# Patient Record
Sex: FEMALE | Race: BLACK OR AFRICAN AMERICAN | Employment: UNEMPLOYED | URBAN - METROPOLITAN AREA
[De-identification: names, ages, dates, MRNs, and addresses within clinical notes are randomized per-mention and may not be internally consistent; named-entity substitution may affect disease eponyms.]

---

## 2019-06-21 ENCOUNTER — HOSPITAL ENCOUNTER (EMERGENCY)
Age: 24
Discharge: HOME | End: 2019-06-21
Attending: EMERGENCY MEDICINE
Payer: MEDICARE

## 2019-06-21 VITALS
HEIGHT: 61 IN | TEMPERATURE: 99.1 F | BODY MASS INDEX: 37.76 KG/M2 | SYSTOLIC BLOOD PRESSURE: 105 MMHG | OXYGEN SATURATION: 99 % | RESPIRATION RATE: 20 BRPM | HEART RATE: 99 BPM | WEIGHT: 200 LBS | DIASTOLIC BLOOD PRESSURE: 81 MMHG

## 2019-06-21 DIAGNOSIS — J45.40 MODERATE PERSISTENT ASTHMA WITHOUT COMPLICATION: Primary | ICD-10-CM

## 2019-06-21 PROCEDURE — 94664 DEMO&/EVAL PT USE INHALER: CPT

## 2019-06-21 PROCEDURE — 99284 EMERGENCY DEPT VISIT MOD MDM: CPT

## 2019-06-21 PROCEDURE — 6370000000 HC RX 637 (ALT 250 FOR IP): Performed by: NURSE PRACTITIONER

## 2019-06-21 PROCEDURE — 94640 AIRWAY INHALATION TREATMENT: CPT

## 2019-06-21 PROCEDURE — 6360000002 HC RX W HCPCS: Performed by: NURSE PRACTITIONER

## 2019-06-21 RX ORDER — ALBUTEROL SULFATE 90 UG/1
2 AEROSOL, METERED RESPIRATORY (INHALATION)
Status: DISCONTINUED | OUTPATIENT
Start: 2019-06-21 | End: 2019-06-22 | Stop reason: HOSPADM

## 2019-06-21 RX ORDER — IPRATROPIUM BROMIDE AND ALBUTEROL SULFATE 2.5; .5 MG/3ML; MG/3ML
1 SOLUTION RESPIRATORY (INHALATION)
Status: DISCONTINUED | OUTPATIENT
Start: 2019-06-22 | End: 2019-06-22 | Stop reason: HOSPADM

## 2019-06-21 RX ORDER — PREDNISONE 10 MG/1
TABLET ORAL
Qty: 20 TABLET | Refills: 0 | Status: SHIPPED | OUTPATIENT
Start: 2019-06-21 | End: 2019-07-01

## 2019-06-21 RX ORDER — ALBUTEROL SULFATE 90 UG/1
2 AEROSOL, METERED RESPIRATORY (INHALATION) EVERY 6 HOURS PRN
Status: DISCONTINUED | OUTPATIENT
Start: 2019-06-21 | End: 2019-06-22 | Stop reason: HOSPADM

## 2019-06-21 RX ORDER — PREDNISONE 20 MG/1
60 TABLET ORAL ONCE
Status: COMPLETED | OUTPATIENT
Start: 2019-06-21 | End: 2019-06-21

## 2019-06-21 RX ORDER — ALBUTEROL SULFATE 2.5 MG/3ML
5 SOLUTION RESPIRATORY (INHALATION)
Status: DISCONTINUED | OUTPATIENT
Start: 2019-06-21 | End: 2019-06-22 | Stop reason: HOSPADM

## 2019-06-21 RX ADMIN — PREDNISONE 60 MG: 20 TABLET ORAL at 22:43

## 2019-06-21 RX ADMIN — ALBUTEROL SULFATE 5 MG: 2.5 SOLUTION RESPIRATORY (INHALATION) at 22:48

## 2019-06-22 NOTE — PROGRESS NOTES
06/21/19 0313   RT Misc Charges   $RT Education $HHN/MDI/IPPB Demo or Eval     Inhaler / Aerosol Education        [x] Served spacer    [x] Good return demonstration per patient   [x] Aerosolized Medications:     Verbal education has been provided in the use, benefits and possible adverse reactions of aerosolized medications used in the treatment of this patient.     [x] Other: pt given MDI and spacer to take home per Miguel Angel Rodrigues, DEON - CNP

## 2019-06-22 NOTE — ED PROVIDER NOTES
99.1 °F (37.3 °C). Her blood pressure is 105/81 and her pulse is 99. Her respiration is 20 and oxygen saturation is 99%.    21 y.o. female heart regular rate and rhythm, lungs clear to auscultation, abdomen soft and nontender, capillary refill less than 2 seconds, radial pulses 2+/4 and equal bilaterally    Impression: Vital signs are stable. Heart regular rate and rhythm. Lungs inspiratory and expiratory wheezing heard at the bases. Patient was treated with a breathing treatment and 60 mg prednisone with significant improvement in her symptoms. She was given a albuterol inhaler with a spacer to take home. I suspect she was having a asthma exacerbation. I have low suspicion for pneumonia, sepsis, CAD, PE or anaphylaxis. Plan: The patient will be discharged home with a prescription for a prednisone burst and she was told to take her albuterol inhaler as needed. Instructed to follow-up with her PCP in 1 to 2 days and to return to the ED for worsening symptoms or any other concern.          Amy Johnson DO  Attending Emergency Physician         Amy Johnson DO  06/22/19 1644

## 2019-06-22 NOTE — PROGRESS NOTES
Emergency Department Bronchodilator Assessment    Patient Assessment complete. No admission diagnoses are documented for this encounter. .   Vitals:    06/21/19 2234   BP: 105/81   Pulse: 99   Resp: 20   Temp: 99.1 °F (37.3 °C)   SpO2: 99%   . Patients home meds are   Prior to Admission medications    Medication Sig Start Date End Date Taking? Authorizing Provider   meloxicam (MOBIC) 15 MG tablet Take 15 mg by mouth daily    Historical Provider, MD Philip MENG treatment(s) given x 1.       Breath Sounds:   LUNG FIELD Pre-Treatment Post-Treatment   RUL  insp/exp whz      RML insp/exp whz      RLL   insp/exp whz      RADHA   insp/exp whz      LLL   insp/exp whz        COMMENTS:    · Bronchodilator assessment at level  3  []    Bronchodilator Assessment    BRONCHODILATOR ASSESSMENT SCORE  Score 1 2 3 4   Breath Sounds   []  Clear []  Mild Wheezing with good aeration [x]  Moderate I/E wheezing with adequate aeration []  Poor Aeration or diffuse wheezing   Respiratory Rate [x]  Less than 20 []  20-25 []  Greater than 25  []  Greater than 35    Dyspnea []  No SOB  [x]  SOB with minimal activity []  Speaking in partial sentences []  Acute/ At rest   Peakflow (asthma) []  80 % or greater predicted/PB  []  Unable []  70% or greater predicted/PB  []  Unable []  51%-70% predicted/PB  []  Unable []  Less than 50% predicted/PB  []  Unable due to distress   FEV1 % Predicted []  Greater than 69%  []  Unable  []  Less than 50%-69%  []  Unable  []  Less than 35%-49%  []  Unable  []  Less than 35%  []  Unable due to distress     Predicted Peak Expiratory Flow Rate                                       Height (in)  Female       Height (in) Male           Age 64 61 64 63 64 65 72 79 Age            51 890 272 295 821 615 113 797 215  17 93 85 72 25 36 09 79 35   09 738 791 927 832 323 818 947 886 92 664 390 822 419 451 392 783 432 352   05 718 563 402 834 223 460 089 864 88 608 729 949 [phone]   09 209 293 408 299 444 [id number] [id number]   70 269 284 299 314 329 344 359 374 70 353 382 410 700 476 445 133 188 598   89 379 347 534 271 220 896 981 936 50 495 598 671 880 548 [phone] 545   14 284 165 607 384 173 778 717 827 68 182 324 655 657 290 [phone]

## 2019-07-01 ENCOUNTER — HOSPITAL ENCOUNTER (EMERGENCY)
Age: 24
Discharge: HOME | End: 2019-07-01
Attending: EMERGENCY MEDICINE
Payer: MEDICARE

## 2019-07-01 ENCOUNTER — APPOINTMENT (OUTPATIENT)
Dept: ULTRASOUND IMAGING | Age: 24
End: 2019-07-01
Payer: MEDICARE

## 2019-07-01 VITALS
SYSTOLIC BLOOD PRESSURE: 122 MMHG | DIASTOLIC BLOOD PRESSURE: 86 MMHG | RESPIRATION RATE: 14 BRPM | BODY MASS INDEX: 39.68 KG/M2 | WEIGHT: 210 LBS | TEMPERATURE: 99 F | HEART RATE: 94 BPM | OXYGEN SATURATION: 98 %

## 2019-07-01 DIAGNOSIS — O20.0 THREATENED MISCARRIAGE IN EARLY PREGNANCY: Primary | ICD-10-CM

## 2019-07-01 LAB
-: ABNORMAL
AMORPHOUS: ABNORMAL
BACTERIA: ABNORMAL
BILIRUBIN URINE: NEGATIVE
CASTS UA: ABNORMAL /LPF (ref 0–8)
COLOR: YELLOW
COMMENT UA: ABNORMAL
CRYSTALS, UA: ABNORMAL /HPF
DIRECT EXAM: NORMAL
EPITHELIAL CELLS UA: ABNORMAL /HPF (ref 0–5)
GLUCOSE URINE: NEGATIVE
HCG QUANTITATIVE: 2589 IU/L
KETONES, URINE: NEGATIVE
LEUKOCYTE ESTERASE, URINE: ABNORMAL
Lab: NORMAL
MUCUS: ABNORMAL
NITRITE, URINE: NEGATIVE
OTHER OBSERVATIONS UA: ABNORMAL
PH UA: 5 (ref 5–8)
PROTEIN UA: NEGATIVE
RBC UA: ABNORMAL /HPF (ref 0–4)
RENAL EPITHELIAL, UA: ABNORMAL /HPF
SPECIFIC GRAVITY UA: 1.02 (ref 1–1.03)
SPECIMEN DESCRIPTION: NORMAL
TRICHOMONAS: ABNORMAL
TURBIDITY: CLEAR
URINE HGB: NEGATIVE
UROBILINOGEN, URINE: NORMAL
WBC UA: ABNORMAL /HPF (ref 0–5)
YEAST: ABNORMAL

## 2019-07-01 PROCEDURE — 87510 GARDNER VAG DNA DIR PROBE: CPT

## 2019-07-01 PROCEDURE — 87660 TRICHOMONAS VAGIN DIR PROBE: CPT

## 2019-07-01 PROCEDURE — 84702 CHORIONIC GONADOTROPIN TEST: CPT

## 2019-07-01 PROCEDURE — 6370000000 HC RX 637 (ALT 250 FOR IP): Performed by: STUDENT IN AN ORGANIZED HEALTH CARE EDUCATION/TRAINING PROGRAM

## 2019-07-01 PROCEDURE — 81001 URINALYSIS AUTO W/SCOPE: CPT

## 2019-07-01 PROCEDURE — 87591 N.GONORRHOEAE DNA AMP PROB: CPT

## 2019-07-01 PROCEDURE — 76801 OB US < 14 WKS SINGLE FETUS: CPT

## 2019-07-01 PROCEDURE — 99284 EMERGENCY DEPT VISIT MOD MDM: CPT

## 2019-07-01 PROCEDURE — 87480 CANDIDA DNA DIR PROBE: CPT

## 2019-07-01 PROCEDURE — 87086 URINE CULTURE/COLONY COUNT: CPT

## 2019-07-01 PROCEDURE — 87491 CHLMYD TRACH DNA AMP PROBE: CPT

## 2019-07-01 RX ORDER — SWAB
1 SWAB, NON-MEDICATED MISCELLANEOUS DAILY
Qty: 30 TABLET | Refills: 0 | Status: SHIPPED | OUTPATIENT
Start: 2019-07-01 | End: 2021-08-14

## 2019-07-01 RX ORDER — CEPHALEXIN 500 MG/1
500 CAPSULE ORAL 3 TIMES DAILY
Qty: 21 CAPSULE | Refills: 0 | Status: SHIPPED | OUTPATIENT
Start: 2019-07-01 | End: 2019-07-11

## 2019-07-01 RX ORDER — CEPHALEXIN 500 MG/1
500 CAPSULE ORAL ONCE
Status: COMPLETED | OUTPATIENT
Start: 2019-07-01 | End: 2019-07-01

## 2019-07-01 RX ADMIN — CEPHALEXIN 500 MG: 500 CAPSULE ORAL at 11:39

## 2019-07-01 ASSESSMENT — PAIN DESCRIPTION - PAIN TYPE: TYPE: ACUTE PAIN

## 2019-07-01 ASSESSMENT — ENCOUNTER SYMPTOMS
COUGH: 0
ABDOMINAL PAIN: 0
SHORTNESS OF BREATH: 0
RHINORRHEA: 0
BACK PAIN: 0
NAUSEA: 0
VOMITING: 0

## 2019-07-01 ASSESSMENT — PAIN DESCRIPTION - LOCATION: LOCATION: ABDOMEN

## 2019-07-01 ASSESSMENT — PAIN DESCRIPTION - DESCRIPTORS: DESCRIPTORS: CRAMPING

## 2019-07-01 ASSESSMENT — PAIN DESCRIPTION - PROGRESSION: CLINICAL_PROGRESSION: NOT CHANGED

## 2019-07-01 ASSESSMENT — PAIN DESCRIPTION - ORIENTATION: ORIENTATION: LOWER

## 2019-07-01 ASSESSMENT — PAIN DESCRIPTION - FREQUENCY: FREQUENCY: INTERMITTENT

## 2019-07-01 ASSESSMENT — PAIN SCALES - GENERAL: PAINLEVEL_OUTOF10: 8

## 2019-07-01 NOTE — ED PROVIDER NOTES
Day Smoker     Types: Cigarettes   Substance and Sexual Activity    Alcohol use: No    Drug use: No    Sexual activity: Not on file   Lifestyle    Physical activity:     Days per week: Not on file     Minutes per session: Not on file    Stress: Not on file   Relationships    Social connections:     Talks on phone: Not on file     Gets together: Not on file     Attends Hindu service: Not on file     Active member of club or organization: Not on file     Attends meetings of clubs or organizations: Not on file     Relationship status: Not on file    Intimate partner violence:     Fear of current or ex partner: Not on file     Emotionally abused: Not on file     Physically abused: Not on file     Forced sexual activity: Not on file   Other Topics Concern    Not on file   Social History Narrative    Not on file        History reviewed. No pertinent family history. Allergies:  Patient has no known allergies. Home Medications:     Prior to Admission medications    Medication Sig Start Date End Date Taking? Authorizing Provider   cephALEXin (KEFLEX) 500 MG capsule Take 1 capsule by mouth 3 times daily 7/1/19  Yes Giovani Mendez MD   Prenatal Vit-Fe Fumarate-FA (PRENATAL VITAMIN) 28-0.8 MG TABS tablet Take 1 tablet by mouth daily 7/1/19  Yes Giovani Mendez MD   predniSONE (DELTASONE) 10 MG tablet Take 4 tablets by mouth once daily for 5 days 6/21/19 7/1/19 Yes Dawn Juarez, APRN - CNP       REVIEW OF SYSTEMS    (2-9 systems for level 4, 10 or more for level 5)       Review of Systems   Constitutional: Negative for chills and fever. HENT: Negative for congestion and rhinorrhea. Respiratory: Negative for cough and shortness of breath. Cardiovascular: Negative for chest pain. Gastrointestinal: Negative for abdominal pain, nausea and vomiting. Genitourinary: Positive for vaginal bleeding, vaginal discharge and vaginal pain. Negative for decreased urine volume and urgency.    Musculoskeletal: Negative for back pain and gait problem. Skin: Negative for rash. Neurological: Negative for light-headedness and headaches. PHYSICAL EXAM   (up to 7 for level 4, 8 or more for level 5)      INITIAL VITALS:    /86   Pulse 94   Temp 99 °F (37.2 °C) (Oral)   Resp 14   Wt 210 lb (95.3 kg)   LMP 2019   SpO2 98%   BMI 39.68 kg/m²      Physical Exam   Constitutional: She is oriented to person, place, and time. She appears well-developed and well-nourished. HENT:   Head: Normocephalic and atraumatic. Mouth/Throat: Oropharynx is clear and moist.   Eyes: Conjunctivae and EOM are normal.   Cardiovascular: Normal rate and regular rhythm. Pulmonary/Chest: Effort normal and breath sounds normal.   Abdominal: Soft. Bowel sounds are normal.   Midline suprapubic ttp, no rebound or guarding, no RLQ tenderness, No CVA tenderness   Musculoskeletal: Normal range of motion. She exhibits no edema. Neurological: She is alert and oriented to person, place, and time. Skin: Skin is warm. Capillary refill takes less than 2 seconds. Nursing note and vitals reviewed.       DIFFERENTIAL  DIAGNOSIS     PLAN (LABS / IMAGING / EKG):     Orders Placed This Encounter   Procedures    VAGINITIS DNA PROBE    C.trachomatis N.gonorrhoeae DNA    Urine Culture    US OB LESS THAN 14 WEEKS SINGLE OR FIRST GESTATION    HCG, Quantitative, Pregnancy    Urinalysis    Microscopic Urinalysis    Vaginal exam    Insert peripheral IV       MEDICATIONS ORDERED:     Orders Placed This Encounter   Medications    cephALEXin (KEFLEX) capsule 500 mg    cephALEXin (KEFLEX) 500 MG capsule     Sig: Take 1 capsule by mouth 3 times daily     Dispense:  21 capsule     Refill:  0    Prenatal Vit-Fe Fumarate-FA (PRENATAL VITAMIN) 28-0.8 MG TABS tablet     Sig: Take 1 tablet by mouth daily     Dispense:  30 tablet     Refill:  0       DDX: ectopic, threatned , UTI, STI    DIAGNOSTIC RESULTS / EMERGENCY DEPARTMENT COURSE /

## 2019-07-02 LAB
C TRACH DNA GENITAL QL NAA+PROBE: ABNORMAL
CULTURE: NORMAL
Lab: NORMAL
N. GONORRHOEAE DNA: NEGATIVE
SPECIMEN DESCRIPTION: ABNORMAL
SPECIMEN DESCRIPTION: NORMAL

## 2019-07-03 ENCOUNTER — TELEPHONE (OUTPATIENT)
Dept: PHARMACY | Age: 24
End: 2019-07-03

## 2019-07-03 RX ORDER — AZITHROMYCIN 500 MG/1
1000 TABLET, FILM COATED ORAL ONCE
Qty: 2 TABLET | Refills: 0 | Status: SHIPPED | OUTPATIENT
Start: 2019-07-03 | End: 2019-07-03

## 2019-07-05 ASSESSMENT — ENCOUNTER SYMPTOMS
COUGH: 1
SHORTNESS OF BREATH: 1

## 2019-07-05 NOTE — ED PROVIDER NOTES
[facility] ED  Emergency Department Encounter  Mid Level Provider     Pt Name: Mikey Baez  MRN: [id number]  [place] 1995  Date of evaluation: 7/5/19  PCP:  No primary care provider on file. CHIEF COMPLAINT       Chief Complaint   Patient presents with    Asthma       HISTORY OF PRESENT ILLNESS  (Location/Symptom, Timing/Onset,Context/Setting, Quality, Duration, Modifying Factors, Severity.)      Mikey Baez is a 21 y.o. female who presents with concern that she is having a difficult time with her asthma. Patient states she has been compliant with her inhaler. Patient is alert without apparent distress breathing easily. Patient has been experiencing cough, mild congestion. No fever chills. PAST MEDICAL /SURGICAL / SOCIAL / FAMILY HISTORY      has a past medical history of Asthma and Headache(784.0). has a past surgical history that includes Tonsillectomy.     Social History     Socioeconomic History    Marital status: Single     Spouse name: Not on file    Number of children: Not on file    Years of education: Not on file    Highest education level: Not on file   Occupational History    Not on file   Social Needs    Financial resource strain: Not on file    Food insecurity:     Worry: Not on file     Inability: Not on file    Transportation needs:     Medical: Not on file     Non-medical: Not on file   Tobacco Use    Smoking status: Current Every Day Smoker     Types: Cigarettes   Substance and Sexual Activity    Alcohol use: No    Drug use: No    Sexual activity: Not on file   Lifestyle    Physical activity:     Days per week: Not on file     Minutes per session: Not on file    Stress: Not on file   Relationships    Social connections:     Talks on phone: Not on file     Gets together: Not on file     Attends Pentecostal service: Not on file     Active member of club or organization: Not on file     Attends meetings of clubs or organizations: Not on file Relationship status: Not on file    Intimate partner violence:     Fear of current or ex partner: Not on file     Emotionally abused: Not on file     Physically abused: Not on file     Forced sexual activity: Not on file   Other Topics Concern    Not on file   Social History Narrative    Not on file       History reviewed. No pertinent family history. Allergies:  Patient has no known allergies. Home Medications:  Prior to Admission medications    Medication Sig Start Date End Date Taking? Authorizing Provider   cephALEXin (KEFLEX) 500 MG capsule Take 1 capsule by mouth 3 times daily 7/1/19   Wisam Salmon MD   Prenatal Vit-Fe Fumarate-FA (PRENATAL VITAMIN) 28-0.8 MG TABS tablet Take 1 tablet by mouth daily 7/1/19   Wisam Salmon MD       REVIEW OF SYSTEMS    (2-9 systems for level 4, 10 or more for level 5)      Review of Systems   Constitutional: Negative for chills and fever. HENT: Positive for congestion. Respiratory: Positive for cough and shortness of breath. Cardiovascular: Negative for chest pain. Musculoskeletal: Negative for myalgias. Allergic/Immunologic: Negative for immunocompromised state. Neurological: Negative for headaches. PHYSICALEXAM   (upto 7 for level 4, 8 or more for level 5)      INITIAL VITALS:  height is 5' 1\" (1.549 m) and weight is 200 lb (90.7 kg). Her oral temperature is 99.1 °F (37.3 °C). Her blood pressure is 105/81 and her pulse is 99. Her respiration is 20 and oxygen saturation is 99%. Physical Exam   Constitutional: She is oriented to person, place, and time. She appears well-developed and well-nourished. No distress. HENT:   Head: Normocephalic. Eyes: Pupils are equal, round, and reactive to light. Neck: Normal range of motion. Neck supple. Cardiovascular: Normal rate. Pulmonary/Chest: Effort normal and breath sounds normal. No respiratory distress. She has no wheezes. Abdominal: Soft. There is no tenderness.    Musculoskeletal: Normal range of motion. Neurological: She is alert and oriented to person, place, and time. Skin: Skin is warm and dry. Capillary refill takes less than 2 seconds. Psychiatric: She has a normal mood and affect. Her behavior is normal. Judgment and thought content normal.   Nursing note and vitals reviewed. DIFFERENTIAL  DIAGNOSIS   Asthma exacerbation, URI    PLAN (LABS / IMAGING / EKG):  No orders of the defined types were placed in this encounter. MEDICATIONS ORDERED:  Orders Placed This Encounter   Medications    predniSONE (DELTASONE) tablet 60 mg    DISCONTD: albuterol (PROVENTIL) nebulizer solution 5 mg    DISCONTD: ipratropium-albuterol (DUONEB) nebulizer solution 1 ampule    DISCONTD: albuterol (PROVENTIL) nebulizer solution 5 mg    DISCONTD: albuterol sulfate  (90 Base) MCG/ACT inhaler 2 puff    DISCONTD: albuterol sulfate  (90 Base) MCG/ACT inhaler 2 puff    DISCONTD: ipratropium (ATROVENT HFA) 17 MCG/ACT inhaler 2 puff    DISCONTD: ipratropium (ATROVENT) 0.02 % nebulizer solution 0.5 mg    DISCONTD: albuterol sulfate  (90 Base) MCG/ACT inhaler 2 puff    predniSONE (DELTASONE) 10 MG tablet     Sig: Take 4 tablets by mouth once daily for 5 days     Dispense:  20 tablet     Refill:  0       Controlled Substances Monitoring:      DIAGNOSTIC RESULTS / EMERGENCY DEPARTMENT COURSE / MDM     RADIOLOGY:   I directly visualized(with the attending physician) the following  images and reviewed the radiologist interpretations:  Us Ob Less Than 14 Weeks Single Or First Gestation    Result Date: 7/1/2019  EXAMINATION: 1st trimester OB ultrasound 7/1/2019 TECHNIQUE: Transabdominal approach. Transvaginal imaging declined. COMPARISON: None HISTORY: ORDERING SYSTEM PROVIDED HISTORY: eval for ectopic, positive quant FINDINGS: Gestational Sac(s):  Single intrauterine gestational sac measures 0.58 cm. No evidence of subchorionic hemorrhage.  Yolk Sac:  Not visualized Fetal Pole: Not visualized Right ovary: 2.8 x 2.4 x 1.5 cm Left ovary: 2.0 x 2.0 x 1.9 cm Free fluid: None Measurements: Estimated gestational age by current ultrasound: 5 weeks and 2 days Estimated Due Date: 02/29/2020     Single intrauterine gestational sac without evidence of fetal pole or yolk sac. Findings favor early IUP with fetal pole likely being too early to visualize. Recommend short-term follow-up ultrasound to confirm fetal viability. No orders to display       LABS:  No results found for this visit on 06/21/19. EMERGENCY DEPARTMENT COURSE:  She is well-appearing. Lungs are clear however she does report persistent cough especially at night. Concern for asthma exacerbation. Will provide burst of steroids and have her continue use inhaler as directed. Patient encouraged to follow-up with primary care doctor. She understands return for worsening symptoms or new concerns    CONSULTS:  None    PROCEDURES:  None    FINAL IMPRESSION      1. Moderate persistent asthma without complication          DISPOSITION / PLAN     DISPOSITION Decision To Discharge    PATIENT REFERRED TO:  No follow-up provider specified.     DISCHARGE MEDICATIONS:  Discharge Medication List as of 6/21/2019 11:21 PM      START taking these medications    Details   predniSONE (DELTASONE) 10 MG tablet Take 4 tablets by mouth once daily for 5 days, Disp-20 tablet, R-0Print             DEON Jamil CNP   Emergency Medicine Nurse Practitioner    (Please note that portions of this note were completed with a voice recognitionprogram.  Efforts were made to edit the dictations but occasionally words are mis-transcribed.)        DEON Jamil CNP  07/05/19 [address] [facility] [facility] APRN - CNP  07/05/19 [facility]

## 2019-07-11 ENCOUNTER — HOSPITAL ENCOUNTER (EMERGENCY)
Age: 24
Discharge: HOME | End: 2019-07-11
Attending: EMERGENCY MEDICINE
Payer: MEDICARE

## 2019-07-11 VITALS
OXYGEN SATURATION: 98 % | RESPIRATION RATE: 16 BRPM | BODY MASS INDEX: 37.79 KG/M2 | HEART RATE: 80 BPM | SYSTOLIC BLOOD PRESSURE: 121 MMHG | DIASTOLIC BLOOD PRESSURE: 78 MMHG | WEIGHT: 200 LBS | TEMPERATURE: 98.6 F

## 2019-07-11 DIAGNOSIS — O23.41 URINARY TRACT INFECTION IN MOTHER DURING FIRST TRIMESTER OF PREGNANCY: ICD-10-CM

## 2019-07-11 DIAGNOSIS — O21.9 NAUSEA AND VOMITING DURING PREGNANCY: Primary | ICD-10-CM

## 2019-07-11 LAB
-: ABNORMAL
AMORPHOUS: ABNORMAL
BACTERIA: ABNORMAL
BILIRUBIN URINE: NEGATIVE
CASTS UA: ABNORMAL /LPF (ref 0–8)
COLOR: ABNORMAL
CRYSTALS, UA: ABNORMAL /HPF
EPITHELIAL CELLS UA: ABNORMAL /HPF (ref 0–5)
GLUCOSE URINE: NEGATIVE
KETONES, URINE: ABNORMAL
LEUKOCYTE ESTERASE, URINE: ABNORMAL
MUCUS: ABNORMAL
NITRITE, URINE: NEGATIVE
OTHER OBSERVATIONS UA: ABNORMAL
PH UA: 5.5 (ref 5–8)
PROTEIN UA: ABNORMAL
RBC UA: ABNORMAL /HPF (ref 0–4)
RENAL EPITHELIAL, UA: ABNORMAL /HPF
SPECIFIC GRAVITY UA: 1.03 (ref 1–1.03)
TRICHOMONAS: ABNORMAL
TURBIDITY: ABNORMAL
URINE HGB: NEGATIVE
UROBILINOGEN, URINE: NORMAL
WBC UA: ABNORMAL /HPF (ref 0–5)
YEAST: ABNORMAL

## 2019-07-11 PROCEDURE — 81001 URINALYSIS AUTO W/SCOPE: CPT

## 2019-07-11 PROCEDURE — 99283 EMERGENCY DEPT VISIT LOW MDM: CPT

## 2019-07-11 PROCEDURE — 6370000000 HC RX 637 (ALT 250 FOR IP): Performed by: EMERGENCY MEDICINE

## 2019-07-11 PROCEDURE — 87086 URINE CULTURE/COLONY COUNT: CPT

## 2019-07-11 RX ORDER — DIPHENHYDRAMINE HYDROCHLORIDE 25 MG/1
25 CAPSULE ORAL 3 TIMES DAILY
Qty: 30 TABLET | Refills: 0 | Status: SHIPPED | OUTPATIENT
Start: 2019-07-11 | End: 2021-08-14

## 2019-07-11 RX ORDER — NITROFURANTOIN 25; 75 MG/1; MG/1
100 CAPSULE ORAL 2 TIMES DAILY
Qty: 14 CAPSULE | Refills: 0 | Status: SHIPPED | OUTPATIENT
Start: 2019-07-11 | End: 2019-07-15

## 2019-07-11 RX ORDER — NITROFURANTOIN 25; 75 MG/1; MG/1
100 CAPSULE ORAL ONCE
Status: COMPLETED | OUTPATIENT
Start: 2019-07-11 | End: 2019-07-11

## 2019-07-11 RX ORDER — PROMETHAZINE HYDROCHLORIDE 25 MG/1
25 TABLET ORAL ONCE
Status: COMPLETED | OUTPATIENT
Start: 2019-07-11 | End: 2019-07-11

## 2019-07-11 RX ADMIN — PROMETHAZINE HYDROCHLORIDE 25 MG: 25 TABLET ORAL at 14:00

## 2019-07-11 RX ADMIN — NITROFURANTOIN MONOHYDRATE/MACROCRYSTALLINE 100 MG: 25; 75 CAPSULE ORAL at 15:15

## 2019-07-12 LAB
CULTURE: NO GROWTH
Lab: NORMAL
SPECIMEN DESCRIPTION: NORMAL

## 2019-07-15 ASSESSMENT — ENCOUNTER SYMPTOMS
BACK PAIN: 0
ABDOMINAL PAIN: 0
SORE THROAT: 0
BLOOD IN STOOL: 0
NAUSEA: 1
CHEST TIGHTNESS: 0
VOMITING: 1
CONSTIPATION: 0
DIARRHEA: 0
RHINORRHEA: 0
SHORTNESS OF BREATH: 0

## 2019-07-15 NOTE — ED PROVIDER NOTES
[facility] ED  Emergency Department Encounter  Emergency [address] [facility]     Pt Name: Aj Alvarado  MRN: [id number]  [place] 1995  Date of evaluation: 7/15/19  PCP:  No primary care provider on file. CHIEF COMPLAINT       Chief Complaint   Patient presents with    Nausea    Emesis       HISTORY OF PRESENT ILLNESS  (Location/Symptom, Timing/Onset, Context/Setting, Quality, Duration, Modifying Factors, Severity.)      Aj Alvarado is a 21 y.o. female who presents with c/o n/v for the past 3 days. Pt reports that she is 5 weeks pregnant and per chart review had a transvaginal US about a week ago that showed an early IUP. Pt is . Pt states her symptoms of n/v are worse than prior pregnancies. She otherwise denies lightheadedness, blood in vomit, abdominal pain, f/c, cp, sob, vaginal bleeding/discharge, burning with urination or any other urinary complaints. She reports that she was recently treated for a UTI and finished her abx course of keflex. LMP was . Pt reports she's unsure if she's going to keep this pregnancy. PAST MEDICAL / SURGICAL / SOCIAL / FAMILY HISTORY      has a past medical history of Asthma and Headache(784.0). has a past surgical history that includes Tonsillectomy.     Social History     Socioeconomic History    Marital status: Single     Spouse name: Not on file    Number of children: Not on file    Years of education: Not on file    Highest education level: Not on file   Occupational History    Not on file   Social Needs    Financial resource strain: Not on file    Food insecurity:     Worry: Not on file     Inability: Not on file    Transportation needs:     Medical: Not on file     Non-medical: Not on file   Tobacco Use    Smoking status: Former Smoker     Types: Cigarettes    Smokeless tobacco: Never Used   Substance and Sexual Activity    Alcohol use: No    Drug use: No    Sexual activity: Not on file   Lifestyle    Physical activity:

## 2019-10-02 ENCOUNTER — HOSPITAL ENCOUNTER (EMERGENCY)
Age: 24
Discharge: HOME | End: 2019-10-02
Attending: EMERGENCY MEDICINE
Payer: MEDICARE

## 2019-10-02 VITALS
TEMPERATURE: 97.3 F | HEART RATE: 92 BPM | SYSTOLIC BLOOD PRESSURE: 128 MMHG | OXYGEN SATURATION: 98 % | RESPIRATION RATE: 16 BRPM | DIASTOLIC BLOOD PRESSURE: 78 MMHG | HEIGHT: 61 IN | WEIGHT: 210 LBS | BODY MASS INDEX: 39.65 KG/M2

## 2019-10-02 DIAGNOSIS — R82.81 STERILE PYURIA: ICD-10-CM

## 2019-10-02 DIAGNOSIS — B37.31 VAGINAL CANDIDIASIS: Primary | ICD-10-CM

## 2019-10-02 LAB
-: NORMAL
AMORPHOUS: NORMAL
BACTERIA: NORMAL
BILIRUBIN URINE: NEGATIVE
CASTS UA: NORMAL /LPF (ref 0–8)
COLOR: YELLOW
COMMENT UA: ABNORMAL
CRYSTALS, UA: NORMAL /HPF
DIRECT EXAM: ABNORMAL
EPITHELIAL CELLS UA: NORMAL /HPF (ref 0–5)
GLUCOSE URINE: NEGATIVE
HCG(URINE) PREGNANCY TEST: NEGATIVE
KETONES, URINE: NEGATIVE
LEUKOCYTE ESTERASE, URINE: ABNORMAL
Lab: ABNORMAL
MUCUS: NORMAL
NITRITE, URINE: NEGATIVE
OTHER OBSERVATIONS UA: NORMAL
PH UA: 5 (ref 5–8)
PROTEIN UA: NEGATIVE
RBC UA: NORMAL /HPF (ref 0–4)
RENAL EPITHELIAL, UA: NORMAL /HPF
SPECIFIC GRAVITY UA: 1.03 (ref 1–1.03)
SPECIMEN DESCRIPTION: ABNORMAL
TRICHOMONAS: NORMAL
TURBIDITY: CLEAR
URINE HGB: ABNORMAL
UROBILINOGEN, URINE: NORMAL
WBC UA: NORMAL /HPF (ref 0–5)
YEAST: NORMAL

## 2019-10-02 PROCEDURE — 87491 CHLMYD TRACH DNA AMP PROBE: CPT

## 2019-10-02 PROCEDURE — 87591 N.GONORRHOEAE DNA AMP PROB: CPT

## 2019-10-02 PROCEDURE — 6370000000 HC RX 637 (ALT 250 FOR IP): Performed by: STUDENT IN AN ORGANIZED HEALTH CARE EDUCATION/TRAINING PROGRAM

## 2019-10-02 PROCEDURE — 96372 THER/PROPH/DIAG INJ SC/IM: CPT

## 2019-10-02 PROCEDURE — 87480 CANDIDA DNA DIR PROBE: CPT

## 2019-10-02 PROCEDURE — 81025 URINE PREGNANCY TEST: CPT

## 2019-10-02 PROCEDURE — 6360000002 HC RX W HCPCS: Performed by: STUDENT IN AN ORGANIZED HEALTH CARE EDUCATION/TRAINING PROGRAM

## 2019-10-02 PROCEDURE — 81001 URINALYSIS AUTO W/SCOPE: CPT

## 2019-10-02 PROCEDURE — 87086 URINE CULTURE/COLONY COUNT: CPT

## 2019-10-02 PROCEDURE — 87510 GARDNER VAG DNA DIR PROBE: CPT

## 2019-10-02 PROCEDURE — 99283 EMERGENCY DEPT VISIT LOW MDM: CPT

## 2019-10-02 PROCEDURE — 87660 TRICHOMONAS VAGIN DIR PROBE: CPT

## 2019-10-02 RX ORDER — CEFTRIAXONE SODIUM 250 MG/1
250 INJECTION, POWDER, FOR SOLUTION INTRAMUSCULAR; INTRAVENOUS ONCE
Status: COMPLETED | OUTPATIENT
Start: 2019-10-02 | End: 2019-10-02

## 2019-10-02 RX ORDER — FLUCONAZOLE 50 MG/1
150 TABLET ORAL ONCE
Status: COMPLETED | OUTPATIENT
Start: 2019-10-02 | End: 2019-10-02

## 2019-10-02 RX ORDER — AZITHROMYCIN 250 MG/1
1000 TABLET, FILM COATED ORAL ONCE
Status: COMPLETED | OUTPATIENT
Start: 2019-10-02 | End: 2019-10-02

## 2019-10-02 RX ADMIN — AZITHROMYCIN 1000 MG: 250 TABLET, FILM COATED ORAL at 10:43

## 2019-10-02 RX ADMIN — FLUCONAZOLE 150 MG: 50 TABLET ORAL at 11:51

## 2019-10-02 RX ADMIN — CEFTRIAXONE SODIUM 250 MG: 250 INJECTION, POWDER, FOR SOLUTION INTRAMUSCULAR; INTRAVENOUS at 10:44

## 2019-10-02 ASSESSMENT — ENCOUNTER SYMPTOMS
BLOOD IN STOOL: 0
ABDOMINAL PAIN: 0
CONSTIPATION: 1
RECTAL PAIN: 0
VOMITING: 0
SHORTNESS OF BREATH: 0
ANAL BLEEDING: 0
NAUSEA: 0
DIARRHEA: 0

## 2019-10-02 ASSESSMENT — PAIN SCALES - GENERAL: PAINLEVEL_OUTOF10: 3

## 2019-10-02 ASSESSMENT — PAIN DESCRIPTION - PAIN TYPE: TYPE: ACUTE PAIN

## 2019-10-02 ASSESSMENT — PAIN DESCRIPTION - LOCATION: LOCATION: VAGINA

## 2019-10-02 ASSESSMENT — PAIN DESCRIPTION - FREQUENCY: FREQUENCY: INTERMITTENT

## 2019-10-03 LAB
C TRACH DNA GENITAL QL NAA+PROBE: NEGATIVE
CULTURE: NORMAL
Lab: NORMAL
N. GONORRHOEAE DNA: NEGATIVE
SPECIMEN DESCRIPTION: NORMAL
SPECIMEN DESCRIPTION: NORMAL

## 2020-12-06 ENCOUNTER — HOSPITAL ENCOUNTER (EMERGENCY)
Age: 25
Discharge: HOME | End: 2020-12-06
Attending: EMERGENCY MEDICINE
Payer: MEDICARE

## 2020-12-06 VITALS
DIASTOLIC BLOOD PRESSURE: 89 MMHG | OXYGEN SATURATION: 98 % | HEIGHT: 61 IN | HEART RATE: 89 BPM | RESPIRATION RATE: 16 BRPM | BODY MASS INDEX: 49.09 KG/M2 | TEMPERATURE: 97.2 F | WEIGHT: 260 LBS | SYSTOLIC BLOOD PRESSURE: 131 MMHG

## 2020-12-06 PROCEDURE — 99283 EMERGENCY DEPT VISIT LOW MDM: CPT

## 2020-12-06 PROCEDURE — 6370000000 HC RX 637 (ALT 250 FOR IP): Performed by: STUDENT IN AN ORGANIZED HEALTH CARE EDUCATION/TRAINING PROGRAM

## 2020-12-06 RX ORDER — ACETAMINOPHEN 500 MG
1000 TABLET ORAL EVERY 8 HOURS PRN
Qty: 60 TABLET | Refills: 0 | Status: SHIPPED | OUTPATIENT
Start: 2020-12-06 | End: 2021-08-14

## 2020-12-06 RX ORDER — IBUPROFEN 800 MG/1
800 TABLET ORAL ONCE
Status: DISCONTINUED | OUTPATIENT
Start: 2020-12-06 | End: 2020-12-06 | Stop reason: HOSPADM

## 2020-12-06 RX ORDER — DOXYCYCLINE HYCLATE 100 MG
100 TABLET ORAL ONCE
Status: COMPLETED | OUTPATIENT
Start: 2020-12-06 | End: 2020-12-06

## 2020-12-06 RX ORDER — DOXYCYCLINE 100 MG/1
100 TABLET ORAL 2 TIMES DAILY
Qty: 14 TABLET | Refills: 0 | Status: SHIPPED | OUTPATIENT
Start: 2020-12-06 | End: 2020-12-13

## 2020-12-06 RX ORDER — IBUPROFEN 800 MG/1
800 TABLET ORAL EVERY 8 HOURS PRN
Qty: 30 TABLET | Refills: 0 | Status: SHIPPED | OUTPATIENT
Start: 2020-12-06 | End: 2021-08-14

## 2020-12-06 RX ORDER — ACETAMINOPHEN 500 MG
1000 TABLET ORAL ONCE
Status: DISCONTINUED | OUTPATIENT
Start: 2020-12-06 | End: 2020-12-06 | Stop reason: HOSPADM

## 2020-12-06 RX ADMIN — DOXYCYCLINE HYCLATE 100 MG: 100 TABLET, COATED ORAL at 13:44

## 2020-12-06 ASSESSMENT — ENCOUNTER SYMPTOMS
VOMITING: 0
NAUSEA: 0
SHORTNESS OF BREATH: 0
ABDOMINAL PAIN: 0
COUGH: 0

## 2020-12-06 ASSESSMENT — PAIN DESCRIPTION - ORIENTATION: ORIENTATION: LEFT

## 2020-12-06 ASSESSMENT — PAIN DESCRIPTION - PAIN TYPE: TYPE: ACUTE PAIN

## 2020-12-06 ASSESSMENT — PAIN DESCRIPTION - DESCRIPTORS: DESCRIPTORS: ACHING

## 2020-12-06 ASSESSMENT — PAIN SCALES - GENERAL: PAINLEVEL_OUTOF10: 7

## 2020-12-06 NOTE — ED PROVIDER NOTES
[facility]     Emergency Department     Faculty Attestation        I performed a history and physical examination of the patient and discussed management with the resident. I reviewed the residents note and agree with the documented findings and plan of care. Any areas of disagreement are noted on the chart. I was personally present for the key portions of any procedures. I have documented in the chart those procedures where I was not present during the key portions. I have reviewed the emergency nurses triage note. I agree with the chief complaint, past medical history, past surgical history, allergies, medications, social and family history as documented unless otherwise noted below. For mid-level providers such as nurse practitioners as well as physicians assistants:    I have personally seen and evaluated the patient. I find the patient's history and physical exam are consistent with NP/PA documentation. I agree with the care provided, treatment rendered, disposition, & follow-up plan. Additional findings are as noted. Vital Signs: /89   Pulse 89   Temp 97.2 °F (36.2 °C) (Temporal)   Resp 16   Ht 5' 1\" (1.549 m)   Wt 260 lb (117.9 kg)   SpO2 98%   BMI 49.13 kg/m²   PCP:  No primary care provider on file. Pertinent Comments: With history of hidradenitis she has no identifiable drainable abscess at this point. She is otherwise afebrile nontoxic will start antibiotics, return precautions given to patient.       Critical Care  None          Victoria Hewitt MD  Attending Emergency Medicine Physician              Sudha Kraus MD  12/06/20 3619

## 2020-12-06 NOTE — ED NOTES
Patient with hx hydradenitis presents to ED c/o left axilla abscess worsening over the past two weeks with pain, swelling, and white drainage. Patient reports the drainage is malodorous. Patient reports this occurring multiple times in the past and she is supposed to f/u with surgeon to have her sweat glands removed. Her abscesses used to received I&D treatment, but there is too much scar tissue now so she is typically just started on antibiotics and the abscess resolves. Patient denies fever, chills, nausea, vomiting, any other symptoms.      Tyrone Michel RN  12/06/20 1561

## 2020-12-06 NOTE — ED PROVIDER NOTES
[facility] ED  Emergency Department Encounter  EmergencyMedicine Resident     Pt Salo Chua  MRN: [id number]  [place] 1995  Date of evaluation: 12/6/20  PCP:  No primary care provider on file. CHIEF COMPLAINT       Chief Complaint   Patient presents with    Abscess       HISTORY OF PRESENT ILLNESS  (Location/Symptom, Timing/Onset, Context/Setting, Quality, Duration, Modifying Factors, Severity.)      Catalina Gaines is a 22 y.o. female who presents with abscess under her left axilla. Patient notes that this is normal for her last time she was here she was told she had \"many bacteria on her arm causing his condition. \"  Patient notes that she has had multiple incision and drainage and does not want anymore as it causes just worsening scarring and does not relieve her symptoms. Patient been given antibiotics in the past which has relieved her symptoms. She states that this has been going on for 2 weeks and has had intermittent clear to creamy discharge with blood but is otherwise asymptomatic. She notes also like a throbbing pain in her armpit around these lumps. Patient denies any fevers, chills, chest chest pain, shortness breath, numbness tingling, weakness, nor nausea/vomiting. PAST MEDICAL / SURGICAL / SOCIAL / FAMILY HISTORY      has a past medical history of Asthma and Headache(784.0). has a past surgical history that includes Tonsillectomy.       Social History     Socioeconomic History    Marital status: Single     Spouse name: Not on file    Number of children: Not on file    Years of education: Not on file    Highest education level: Not on file   Occupational History    Not on file   Social Needs    Financial resource strain: Not on file    Food insecurity     Worry: Not on file     Inability: Not on file    Transportation needs     Medical: Not on file     Non-medical: Not on file   Tobacco Use    Smoking status: Current Every Day Smoker     Types: Cigars    Smokeless tobacco: Never Used    Tobacco comment: 1 black-n-mild cigar per day   Substance and Sexual Activity    Alcohol use: No    Drug use: No    Sexual activity: Not on file   Lifestyle    Physical activity     Days per week: Not on file     Minutes per session: Not on file    Stress: Not on file   Relationships    Social connections     Talks on phone: Not on file     Gets together: Not on file     Attends Yazidism service: Not on file     Active member of club or organization: Not on file     Attends meetings of clubs or organizations: Not on file     Relationship status: Not on file    Intimate partner violence     Fear of current or ex partner: Not on file     Emotionally abused: Not on file     Physically abused: Not on file     Forced sexual activity: Not on file   Other Topics Concern    Not on file   Social History Narrative    Not on file       History reviewed. No pertinent family history. Allergies:  Patient has no known allergies. Home Medications:  Prior to Admission medications    Medication Sig Start Date End Date Taking?  Authorizing Provider   acetaminophen (TYLENOL) 500 MG tablet Take 2 tablets by mouth every 8 hours as needed for Pain 12/6/20 12/16/20 Yes María Elena Barajas DO   ibuprofen (ADVIL;MOTRIN) 800 MG tablet Take 1 tablet by mouth every 8 hours as needed for Pain 12/6/20 12/16/20 Yes María Elena Barajas DO   doxycycline monohydrate (ADOXA) 100 MG tablet Take 1 tablet by mouth 2 times daily for 7 days 12/6/20 12/13/20 Yes María Elena Barajas DO   doxyLAMINE succinate (GNP SLEEP AID) 25 MG tablet Take 1 tablet by mouth nightly 7/11/19   Tashi Givens MD   vitamin B-6 (PYRIDOXINE) 25 MG tablet Take 1 tablet by mouth 3 times daily 7/11/19   Tashi Givens MD   Prenatal Vit-Fe Fumarate-FA (PRENATAL VITAMIN) 28-0.8 MG TABS tablet Take 1 tablet by mouth daily 7/1/19   Bogdan Mota MD       REVIEW OF SYSTEMS    (2-9 systems for level 4, 10 or more for level 5) any pain   Skin:     General: Skin is warm and dry. Capillary Refill: Capillary refill takes less than 2 seconds. Neurological:      Mental Status: She is alert and oriented to person, place, and time. Motor: No abnormal muscle tone. DIFFERENTIAL  DIAGNOSIS     PLAN (LABS / IMAGING / EKG):  No orders of the defined types were placed in this encounter. MEDICATIONS ORDERED:  Orders Placed This Encounter   Medications    DISCONTD: acetaminophen (TYLENOL) tablet 1,000 mg    DISCONTD: ibuprofen (ADVIL;MOTRIN) tablet 800 mg    doxycycline hyclate (VIBRA-TABS) tablet 100 mg     Order Specific Question:   Antimicrobial Indications     Answer:   Skin and Soft Tissue Infection    acetaminophen (TYLENOL) 500 MG tablet     Sig: Take 2 tablets by mouth every 8 hours as needed for Pain     Dispense:  60 tablet     Refill:  0    ibuprofen (ADVIL;MOTRIN) 800 MG tablet     Sig: Take 1 tablet by mouth every 8 hours as needed for Pain     Dispense:  30 tablet     Refill:  0    doxycycline monohydrate (ADOXA) 100 MG tablet     Sig: Take 1 tablet by mouth 2 times daily for 7 days     Dispense:  14 tablet     Refill:  0       DDX: Hidradenitis suppurativa, abscess    DIAGNOSTIC RESULTS / EMERGENCY DEPARTMENT COURSE / MDM   LAB RESULTS:  No results found for this visit on 12/06/20. IMPRESSION: 80-year-old female presents for painful lumps in her left axilla. Patient appears to be in no acute distress and nontoxic-appearing. Patient initial vitals are stable and nonconcerning. Patient is lung sounds bilaterally. RRR with normal S1-S2 heart sounds with no murmurs rubs or gallops. Abdomen soft nontender with no guarding/rigidity/rebound tenderness. There are multiple indurations patient's left axilla with old healed scar wounds 13 to palpation with no erythema, warmth, or purulent discharge. No fluctuance noted on any of these indurations.   Patient does not wish to have an I&D even if that would increase treated for healing for any possible abscess as she is having multiple times due to pain control. Patient is understanding of the risks such as worsening infection, requiring antibiotics and hospitalization and possible surgery and even death, and would rather try antibiotics. Patient had decision capacity. Concern for above differential diagnosis. Will send patient home with prescription for doxycycline, Tylenol and ibuprofen while receiving doses here. RADIOLOGY:  None    EKG  None    All EKG's are interpreted by the Emergency Department Physician who either signs or Co-signs this chart in the absence of a cardiologist.    EMERGENCY DEPARTMENT COURSE:  Patient was agreeable with discharge plan. Patient was educated on strict return precautions. Patient ambulated out of the ER without difficulty. PROCEDURES:  None    CONSULTS:  None    CRITICAL CARE:  Please see attending note    FINAL IMPRESSION      1. Hidradenitis suppurativa    2.  Abscess          DISPOSITION / PLAN     DISPOSITION Decision To Discharge 12/06/2020 01:45:28 PM      PATIENT REFERRED TO:  [facility] ED  [address] [place] [address]  [phone]  Go to   If symptoms worsen      DISCHARGE MEDICATIONS:  Discharge Medication List as of 12/6/2020  1:48 PM      START taking these medications    Details   acetaminophen (TYLENOL) 500 MG tablet Take 2 tablets by mouth every 8 hours as needed for Pain, Disp-60 tablet,R-0Print      ibuprofen (ADVIL;MOTRIN) 800 MG tablet Take 1 tablet by mouth every 8 hours as needed for Pain, Disp-30 tablet,R-0Print      doxycycline monohydrate (ADOXA) 100 MG tablet Take 1 tablet by mouth 2 times daily for 7 days, Disp-14 tablet,R-0Print             Emma Bolanos DO  Emergency Medicine Resident    (Please note that portions of thisnote were completed with a voice recognition program.  Efforts were made to edit the dictations but occasionally words are mis-transcribed.) Marilu Reyna DO  Resident  12/10/20 [id number]

## 2021-05-17 ENCOUNTER — HOSPITAL ENCOUNTER (EMERGENCY)
Age: 26
Discharge: HOME | End: 2021-05-17
Attending: EMERGENCY MEDICINE
Payer: MEDICARE

## 2021-05-17 VITALS
HEART RATE: 147 BPM | RESPIRATION RATE: 16 BRPM | SYSTOLIC BLOOD PRESSURE: 184 MMHG | OXYGEN SATURATION: 99 % | BODY MASS INDEX: 43.46 KG/M2 | WEIGHT: 230 LBS | TEMPERATURE: 98.6 F | DIASTOLIC BLOOD PRESSURE: 136 MMHG

## 2021-05-17 DIAGNOSIS — L02.91 ABSCESS: Primary | ICD-10-CM

## 2021-05-17 PROCEDURE — 99285 EMERGENCY DEPT VISIT HI MDM: CPT

## 2021-05-17 PROCEDURE — 10060 I&D ABSCESS SIMPLE/SINGLE: CPT

## 2021-05-17 PROCEDURE — 2500000003 HC RX 250 WO HCPCS: Performed by: STUDENT IN AN ORGANIZED HEALTH CARE EDUCATION/TRAINING PROGRAM

## 2021-05-17 PROCEDURE — 6370000000 HC RX 637 (ALT 250 FOR IP): Performed by: STUDENT IN AN ORGANIZED HEALTH CARE EDUCATION/TRAINING PROGRAM

## 2021-05-17 PROCEDURE — 96374 THER/PROPH/DIAG INJ IV PUSH: CPT

## 2021-05-17 PROCEDURE — 99152 MOD SED SAME PHYS/QHP 5/>YRS: CPT

## 2021-05-17 RX ORDER — SULFAMETHOXAZOLE AND TRIMETHOPRIM 800; 160 MG/1; MG/1
1 TABLET ORAL ONCE
Status: COMPLETED | OUTPATIENT
Start: 2021-05-17 | End: 2021-05-17

## 2021-05-17 RX ORDER — IBUPROFEN 200 MG
400 TABLET ORAL EVERY 8 HOURS PRN
Qty: 30 TABLET | Refills: 0 | Status: SHIPPED | OUTPATIENT
Start: 2021-05-17 | End: 2021-08-14

## 2021-05-17 RX ORDER — KETAMINE HYDROCHLORIDE 10 MG/ML
100 INJECTION, SOLUTION INTRAMUSCULAR; INTRAVENOUS ONCE
Status: COMPLETED | OUTPATIENT
Start: 2021-05-17 | End: 2021-05-17

## 2021-05-17 RX ORDER — SULFAMETHOXAZOLE AND TRIMETHOPRIM 800; 160 MG/1; MG/1
1 TABLET ORAL 2 TIMES DAILY
Qty: 14 TABLET | Refills: 0 | Status: SHIPPED | OUTPATIENT
Start: 2021-05-17 | End: 2021-05-24

## 2021-05-17 RX ORDER — IBUPROFEN 800 MG/1
800 TABLET ORAL ONCE
Status: COMPLETED | OUTPATIENT
Start: 2021-05-17 | End: 2021-05-17

## 2021-05-17 RX ORDER — OXYCODONE HYDROCHLORIDE 5 MG/1
5 TABLET ORAL ONCE
Status: COMPLETED | OUTPATIENT
Start: 2021-05-17 | End: 2021-05-17

## 2021-05-17 RX ORDER — OXYCODONE HYDROCHLORIDE 5 MG/1
5 TABLET ORAL EVERY 8 HOURS PRN
Qty: 6 TABLET | Refills: 0 | Status: SHIPPED | OUTPATIENT
Start: 2021-05-17 | End: 2021-05-19

## 2021-05-17 RX ADMIN — KETAMINE HYDROCHLORIDE 100 MG: 10 INJECTION, SOLUTION INTRAMUSCULAR; INTRAVENOUS at 11:39

## 2021-05-17 RX ADMIN — SULFAMETHOXAZOLE AND TRIMETHOPRIM 1 TABLET: 800; 160 TABLET ORAL at 10:47

## 2021-05-17 RX ADMIN — IBUPROFEN 800 MG: 800 TABLET, FILM COATED ORAL at 10:47

## 2021-05-17 RX ADMIN — OXYCODONE HYDROCHLORIDE 5 MG: 5 TABLET ORAL at 10:47

## 2021-05-17 ASSESSMENT — PAIN DESCRIPTION - LOCATION: LOCATION: ARM

## 2021-05-17 ASSESSMENT — ENCOUNTER SYMPTOMS
BACK PAIN: 0
ABDOMINAL PAIN: 0
NAUSEA: 0
VOMITING: 0

## 2021-05-17 ASSESSMENT — PAIN SCALES - GENERAL: PAINLEVEL_OUTOF10: 10

## 2021-05-17 NOTE — ED PROVIDER NOTES
[facility]  Emergency Department  Faculty Attestation     I performed a history and physical examination of the patient and discussed management with the resident. I reviewed the residents note and agree with the documented findings and plan of care. Any areas of disagreement are noted on the chart. I was personally present for the key portions of any procedures. I have documented in the chart those procedures where I was not present during the key portions. I have reviewed the emergency nurses triage note. I agree with the chief complaint, past medical history, past surgical history, allergies, medications, social and family history as documented unless otherwise noted below. For Physician Assistant/ Nurse Practitioner cases/documentation I have personally evaluated this patient and have completed at least one if not all key elements of the E/M (history, physical exam, and MDM). Additional findings are as noted. Primary Care Physician:  No primary care provider on file. Screenings:  [unfilled]    CHIEF COMPLAINT       Chief Complaint   Patient presents with    Abscess     to left arm       RECENT VITALS:   Temp: 98.6 °F (37 °C),  Pulse: 81, Resp: 16, BP: 117/76    LABS:  Labs Reviewed - No data to display    Radiology  No orders to display       Attending Physician Additional  Notes    Patient has significant swelling and pain in the left axilla. She has a history of hidradenitis suppurativa and has had abscess in the same region. She states it started yesterday. There is some drainage already. No history of diabetes or MRSA. On exam she is in moderate distress secondary pain vital signs are normal.  She is unable to tolerate even light palpation of her left axilla which has a large area approximately 10 x 8 cm with approximately 2 cm of elevation with 4 smaller areas in the periphery of the central abscess that are pointing 1 of which has serous drainage. Impression is large left axillary abscess. Patient is requesting deep sedation for this procedure. She understands risks. Plan is IV access, ketamine sedation, local anesthesia, bedside ultrasound, incision and drainage, anticipate discharge home on Bactrim simple analgesics and follow-up. Blade Parsons.  Manan Cagle MD, [facility]  Attending Emergency  Physician               Vera Trotter MD  05/17/21 [id number]

## 2021-05-17 NOTE — ED PROVIDER NOTES
[facility]  ED  Emergency Department Encounter  Emergency Medicine Resident     Pt Name: Drew Gama  MRN: [id number]  [place] 1995  Date of evaluation: 5/17/21  PCP:  No primary care provider on file. CHIEF COMPLAINT       Chief Complaint   Patient presents with    Abscess     to left arm       HISTORY OFPRESENT ILLNESS  (Location/Symptom, Timing/Onset, Context/Setting, Quality, Duration, Modifying Ethyl Opitz.)      Drew Gama is a 22year old female who presents with abscess to left arm. The redness and swelling started several days ago and her symptoms have been worsening. The area started to spontaneously drain this morning. No fevers at home. Patient has not followed up with a primary care physician for management of her frequent abscess formations. The patient does have a history of abscess formation. She was last evaluated 12/2020 without I&D but discharged on doxycycline. PAST MEDICAL / SURGICAL / SOCIAL / FAMILY HISTORY      has a past medical history of Asthma and Headache(784.0). has a past surgical history that includes Tonsillectomy.      Social History     Socioeconomic History    Marital status: Single     Spouse name: Not on file    Number of children: Not on file    Years of education: Not on file    Highest education level: Not on file   Occupational History    Not on file   Tobacco Use    Smoking status: Current Every Day Smoker     Types: Cigars    Smokeless tobacco: Never Used    Tobacco comment: 1 black-n-mild cigar per day   Substance and Sexual Activity    Alcohol use: No    Drug use: No    Sexual activity: Not on file   Other Topics Concern    Not on file   Social History Narrative    Not on file     Social Determinants of Health     Financial Resource Strain:     Difficulty of Paying Living Expenses:    Food Insecurity:     Worried About Running Out of Food in the Last Year:     [address] in the Last Year:    Transportation Needs:     Lack of Transportation (Medical):  Lack of Transportation (Non-Medical):    Physical Activity:     Days of Exercise per Week:     Minutes of Exercise per Session:    Stress:     Feeling of Stress :    Social Connections:     Frequency of Communication with Friends and Family:     Frequency of Social Gatherings with Friends and Family:     Attends Caodaism Services:     Active Member of Clubs or Organizations:     Attends Club or Organization Meetings:     Marital Status:    Intimate Partner Violence:     Fear of Current or Ex-Partner:     Emotionally Abused:     Physically Abused:     Sexually Abused:        History reviewed. No pertinent family history. Allergies:  Patient has no known allergies. Home Medications:  Prior to Admission medications    Medication Sig Start Date End Date Taking? Authorizing Provider   oxyCODONE (ROXICODONE) 5 MG immediate release tablet Take 1 tablet by mouth every 8 hours as needed for Pain for up to 2 days. Intended supply: 3 days.  Take lowest dose possible to manage pain 5/17/21 5/19/21 Yes Misa Bryant MD   ibuprofen (ADVIL;MOTRIN) 200 MG tablet Take 2 tablets by mouth every 8 hours as needed for Pain or Fever 5/17/21  Yes Gwen Frank MD   sulfamethoxazole-trimethoprim (BACTRIM DS) 800-160 MG per tablet Take 1 tablet by mouth 2 times daily for 7 days 5/17/21 5/24/21 Yes Misa Bryant MD   acetaminophen (TYLENOL) 500 MG tablet Take 2 tablets by mouth every 8 hours as needed for Pain 12/6/20 12/16/20  Sully Leslie DO   ibuprofen (ADVIL;MOTRIN) 800 MG tablet Take 1 tablet by mouth every 8 hours as needed for Pain 12/6/20 12/16/20  Sully Leslie DO   doxyLAMINE succinate (GNP SLEEP AID) 25 MG tablet Take 1 tablet by mouth nightly 7/11/19   Louann Sánchez MD   vitamin B-6 (PYRIDOXINE) 25 MG tablet Take 1 tablet by mouth 3 times daily 7/11/19   Louann Sánchez MD   Prenatal Vit-Fe Fumarate-FA (PRENATAL VITAMIN) 28-0.8 MG TABS tablet Take 1 tablet by mouth daily 7/1/19   Carnell Schirmer, MD       REVIEW OFSYSTEMS    (2-9 systems for level 4, 10 or more for level 5)      Review of Systems   Constitutional: Negative for chills and fever. Gastrointestinal: Negative for abdominal pain, nausea and vomiting. Musculoskeletal: Negative for back pain and neck pain. Skin: Positive for wound. PHYSICAL EXAM   (up to 7 for level 4, 8 or more forlevel 5)      INITIAL VITALS:   ED Triage Vitals [05/17/21 0953]   BP Temp Temp Source Pulse Resp SpO2 Height Weight   117/76 98.6 °F (37 °C) Skin 81 16 97 % -- 230 lb (104.3 kg)       Physical Exam  Constitutional:       General: She is not in acute distress. Appearance: She is not diaphoretic. Cardiovascular:      Rate and Rhythm: Normal rate and regular rhythm. Pulmonary:      Effort: Pulmonary effort is normal.      Breath sounds: Normal breath sounds. Musculoskeletal:      Cervical back: Neck supple. Comments: Left axillary: large area of erythema and edema involving the left axilla, multiple areas of fluctuance, spontaneous drainage from one of the many absecess   Skin:     General: Skin is warm. DIFFERENTIAL  DIAGNOSIS     PLAN (LABS / IMAGING / EKG):  No orders of the defined types were placed in this encounter. MEDICATIONS ORDERED:  Orders Placed This Encounter   Medications    oxyCODONE (ROXICODONE) immediate release tablet 5 mg    ibuprofen (ADVIL;MOTRIN) tablet 800 mg    sulfamethoxazole-trimethoprim (BACTRIM DS;SEPTRA DS) 800-160 MG per tablet 1 tablet     Order Specific Question:   Antimicrobial Indications     Answer:   Skin and Soft Tissue Infection    ketamine (KETALAR) injection 100 mg    oxyCODONE (ROXICODONE) 5 MG immediate release tablet     Sig: Take 1 tablet by mouth every 8 hours as needed for Pain for up to 2 days. Intended supply: 3 days.  Take lowest dose possible to manage pain     Dispense:  6 tablet     Refill:  0    ibuprofen (ADVIL;MOTRIN) David Estrada MD.  The attending physician was present and supervising this procedure. DISCUSSION:  Hernandez Wray is a 22y.o.-year-old female who requires procedural sedation for I&D. The history and physical examination were reviewed and confirmed. CONSENT: I have discussed with the patient and/or the patient representative the indication, alternatives, and the possible risks and/or complications of the planned procedure and the anesthesia methods. The patient and/or patient representative appear to understand and agree to proceed. PRE-SEDATION DOCUMENTATION AND EXAM: I have reviewed the patient's history and review of systems. AIRWAY ASSESSMENT: Mallampati Class II - (soft palate, fauces & uvula are visible)    PRIOR HISTORY OF ANESTHESIA COMPLICATIONS: none    ASA CLASSIFICATION: Class 1 - A normal healthy patient    SEDATION/ANESTHESIA PLAN: intravenous sedation    MEDICATIONS USED: ketamine intravenously    MONITORING AND SAFETY: The patient was placed on a cardiac monitor and vital signs, pulse oximetry and level of consciousness were continuously evaluated throughout the procedure. The patient was closely monitored until recovery from the medications was complete and the patient had returned to baseline status. Respiratory therapy was on standby at all times during the procedure.     (The following sections must be completed)  POST-SEDATION VITAL SIGNS: Vital signs were reviewed and were stable after the procedure (see flow sheet for vitals)            POST-SEDATION EXAM: Lungs: clear and Cardiovascular: normal    COMPLICATIONS: none       David Estrada MD  5:45 PM, 5/17/21    PROCEDURE NOTE - INCISION and DRAINAGE    PATIENT NAME: Odette Aguirre RECORD NO. [id number]  DATE: 5/17/2021  ATTENDING PHYSICIAN: Dr. Jeanmarie Gunderson DIAGNOSIS:  Abscess and axillary abscess  POSTOPERATIVE DIAGNOSIS:  Same  PROCEDURE PERFORMED:   Incision and drainage  PERFORMING PHYSICIAN: [facility] Manny MD      DISCUSSION:  Brian Valentin is a 22y.o.-year-old female who requires an incision and drainage of a axillary abscess. The history and physical examination were reviewed and confirmed. CONSENT: The patient provided verbal consent for this procedure. PROCEDURE:  The patient was positioned appropriately. Local anesthesia was obtained by infiltration using 1% Lidocaine without epinephrine. An incision was then made over the greatest area of fluctuance and approximately 6 cc of thick, foul smelling and purulent material was expressed. Loculations were broken up using forceps and more of the material was able to be expressed. The drainage cavity was then dressed with gauze. The patient tolerated the procedure well. COMPLICATIONS:  None     Rod Luna MD  5:46 PM, 5/17/21        CONSULTS:  None    CRITICAL CARE:  Please see attending note    FINAL IMPRESSION      1. Abscess          DISPOSITION / PLAN     DISPOSITION        PATIENT REFERRED TO:  [address] [place] [address]  [phone]    f/u with general surgery      DISCHARGE MEDICATIONS:  Discharge Medication List as of 5/17/2021 12:35 PM      START taking these medications    Details   oxyCODONE (ROXICODONE) 5 MG immediate release tablet Take 1 tablet by mouth every 8 hours as needed for Pain for up to 2 days. Intended supply: 3 days.  Take lowest dose possible to manage pain, Disp-6 tablet, R-0Print      sulfamethoxazole-trimethoprim (BACTRIM DS) 800-160 MG per tablet Take 1 tablet by mouth 2 times daily for 7 days, Disp-14 tablet, R-0Print             Rod Luna MD  Emergency Medicine Resident    (Please note that portions of this note were completed with a voice recognition program.Efforts were made to edit the dictations but occasionally words are mis-transcribed.)        Rod Luna MD  Resident  05/17/21 [id number]

## 2021-06-17 ENCOUNTER — APPOINTMENT (OUTPATIENT)
Dept: CT IMAGING | Age: 26
End: 2021-06-17
Payer: MEDICARE

## 2021-06-17 ENCOUNTER — HOSPITAL ENCOUNTER (EMERGENCY)
Age: 26
Discharge: HOME | End: 2021-06-17
Attending: EMERGENCY MEDICINE
Payer: MEDICARE

## 2021-06-17 VITALS
SYSTOLIC BLOOD PRESSURE: 131 MMHG | HEART RATE: 86 BPM | RESPIRATION RATE: 18 BRPM | OXYGEN SATURATION: 99 % | TEMPERATURE: 98.4 F | DIASTOLIC BLOOD PRESSURE: 80 MMHG

## 2021-06-17 DIAGNOSIS — N93.9 VAGINAL BLEEDING: Primary | ICD-10-CM

## 2021-06-17 LAB
-: ABNORMAL
ABSOLUTE EOS #: 0.5 K/UL (ref 0–0.44)
ABSOLUTE IMMATURE GRANULOCYTE: <0.03 K/UL (ref 0–0.3)
ABSOLUTE LYMPH #: 2.88 K/UL (ref 1.1–3.7)
ABSOLUTE MONO #: 0.49 K/UL (ref 0.1–1.2)
AMORPHOUS: ABNORMAL
ANION GAP SERPL CALCULATED.3IONS-SCNC: 4 MMOL/L (ref 9–17)
BACTERIA: ABNORMAL
BASOPHILS # BLD: 0 % (ref 0–2)
BASOPHILS ABSOLUTE: 0.03 K/UL (ref 0–0.2)
BILIRUBIN URINE: NEGATIVE
BUN BLDV-MCNC: 11 MG/DL (ref 6–20)
BUN/CREAT BLD: ABNORMAL (ref 9–20)
CALCIUM SERPL-MCNC: 9 MG/DL (ref 8.6–10.4)
CASTS UA: ABNORMAL /LPF (ref 0–8)
CHLORIDE BLD-SCNC: 107 MMOL/L (ref 98–107)
CO2: 30 MMOL/L (ref 20–31)
COLOR: YELLOW
CREAT SERPL-MCNC: 0.87 MG/DL (ref 0.5–0.9)
CRYSTALS, UA: ABNORMAL /HPF
DIFFERENTIAL TYPE: ABNORMAL
DIRECT EXAM: ABNORMAL
EOSINOPHILS RELATIVE PERCENT: 6 % (ref 1–4)
EPITHELIAL CELLS UA: ABNORMAL /HPF (ref 0–5)
GFR AFRICAN AMERICAN: >60 ML/MIN
GFR NON-AFRICAN AMERICAN: >60 ML/MIN
GFR SERPL CREATININE-BSD FRML MDRD: ABNORMAL ML/MIN/{1.73_M2}
GFR SERPL CREATININE-BSD FRML MDRD: ABNORMAL ML/MIN/{1.73_M2}
GLUCOSE BLD-MCNC: 100 MG/DL (ref 70–99)
GLUCOSE URINE: NEGATIVE
HCG(URINE) PREGNANCY TEST: NEGATIVE
HCT VFR BLD CALC: 34.4 % (ref 36.3–47.1)
HEMOGLOBIN: 10.7 G/DL (ref 11.9–15.1)
IMMATURE GRANULOCYTES: 0 %
KETONES, URINE: NEGATIVE
LEUKOCYTE ESTERASE, URINE: NEGATIVE
LYMPHOCYTES # BLD: 33 % (ref 24–43)
Lab: ABNORMAL
MCH RBC QN AUTO: 28.7 PG (ref 25.2–33.5)
MCHC RBC AUTO-ENTMCNC: 31.1 G/DL (ref 28.4–34.8)
MCV RBC AUTO: 92.2 FL (ref 82.6–102.9)
MONOCYTES # BLD: 6 % (ref 3–12)
MUCUS: ABNORMAL
NITRITE, URINE: NEGATIVE
NRBC AUTOMATED: 0 PER 100 WBC
OTHER OBSERVATIONS UA: ABNORMAL
PDW BLD-RTO: 13.5 % (ref 11.8–14.4)
PH UA: 7 (ref 5–8)
PLATELET # BLD: 261 K/UL (ref 138–453)
PLATELET ESTIMATE: ABNORMAL
PMV BLD AUTO: 11.9 FL (ref 8.1–13.5)
POTASSIUM SERPL-SCNC: 3.8 MMOL/L (ref 3.7–5.3)
PROTEIN UA: NEGATIVE
RBC # BLD: 3.73 M/UL (ref 3.95–5.11)
RBC # BLD: ABNORMAL 10*6/UL
RBC UA: ABNORMAL /HPF (ref 0–4)
RENAL EPITHELIAL, UA: ABNORMAL /HPF
SEG NEUTROPHILS: 55 % (ref 36–65)
SEGMENTED NEUTROPHILS ABSOLUTE COUNT: 4.82 K/UL (ref 1.5–8.1)
SODIUM BLD-SCNC: 141 MMOL/L (ref 135–144)
SPECIFIC GRAVITY UA: 1.02 (ref 1–1.03)
SPECIMEN DESCRIPTION: ABNORMAL
TRICHOMONAS: ABNORMAL
TURBIDITY: CLEAR
URINE HGB: ABNORMAL
UROBILINOGEN, URINE: NORMAL
WBC # BLD: 8.7 K/UL (ref 3.5–11.3)
WBC # BLD: ABNORMAL 10*3/UL
WBC UA: ABNORMAL /HPF (ref 0–5)
YEAST: ABNORMAL

## 2021-06-17 PROCEDURE — 96374 THER/PROPH/DIAG INJ IV PUSH: CPT

## 2021-06-17 PROCEDURE — 2580000003 HC RX 258: Performed by: STUDENT IN AN ORGANIZED HEALTH CARE EDUCATION/TRAINING PROGRAM

## 2021-06-17 PROCEDURE — 6360000004 HC RX CONTRAST MEDICATION: Performed by: STUDENT IN AN ORGANIZED HEALTH CARE EDUCATION/TRAINING PROGRAM

## 2021-06-17 PROCEDURE — 85025 COMPLETE CBC W/AUTO DIFF WBC: CPT

## 2021-06-17 PROCEDURE — 87491 CHLMYD TRACH DNA AMP PROBE: CPT

## 2021-06-17 PROCEDURE — 70450 CT HEAD/BRAIN W/O DYE: CPT

## 2021-06-17 PROCEDURE — 96376 TX/PRO/DX INJ SAME DRUG ADON: CPT

## 2021-06-17 PROCEDURE — 6360000002 HC RX W HCPCS: Performed by: STUDENT IN AN ORGANIZED HEALTH CARE EDUCATION/TRAINING PROGRAM

## 2021-06-17 PROCEDURE — 87510 GARDNER VAG DNA DIR PROBE: CPT

## 2021-06-17 PROCEDURE — 99285 EMERGENCY DEPT VISIT HI MDM: CPT

## 2021-06-17 PROCEDURE — 70498 CT ANGIOGRAPHY NECK: CPT

## 2021-06-17 PROCEDURE — 80048 BASIC METABOLIC PNL TOTAL CA: CPT

## 2021-06-17 PROCEDURE — 81025 URINE PREGNANCY TEST: CPT

## 2021-06-17 PROCEDURE — 87591 N.GONORRHOEAE DNA AMP PROB: CPT

## 2021-06-17 PROCEDURE — 81001 URINALYSIS AUTO W/SCOPE: CPT

## 2021-06-17 PROCEDURE — 87480 CANDIDA DNA DIR PROBE: CPT

## 2021-06-17 PROCEDURE — 87660 TRICHOMONAS VAGIN DIR PROBE: CPT

## 2021-06-17 RX ORDER — KETOROLAC TROMETHAMINE 15 MG/ML
15 INJECTION, SOLUTION INTRAMUSCULAR; INTRAVENOUS ONCE
Status: COMPLETED | OUTPATIENT
Start: 2021-06-17 | End: 2021-06-17

## 2021-06-17 RX ORDER — METRONIDAZOLE 500 MG/1
500 TABLET ORAL 2 TIMES DAILY
Qty: 14 TABLET | Refills: 0 | Status: SHIPPED | OUTPATIENT
Start: 2021-06-17 | End: 2021-06-24

## 2021-06-17 RX ORDER — 0.9 % SODIUM CHLORIDE 0.9 %
1000 INTRAVENOUS SOLUTION INTRAVENOUS ONCE
Status: COMPLETED | OUTPATIENT
Start: 2021-06-17 | End: 2021-06-17

## 2021-06-17 RX ORDER — DIPHENHYDRAMINE HYDROCHLORIDE 50 MG/ML
25 INJECTION INTRAMUSCULAR; INTRAVENOUS EVERY 6 HOURS PRN
Status: DISCONTINUED | OUTPATIENT
Start: 2021-06-17 | End: 2021-06-17 | Stop reason: HOSPADM

## 2021-06-17 RX ORDER — PROCHLORPERAZINE EDISYLATE 5 MG/ML
5 INJECTION INTRAMUSCULAR; INTRAVENOUS ONCE
Status: COMPLETED | OUTPATIENT
Start: 2021-06-17 | End: 2021-06-17

## 2021-06-17 RX ADMIN — KETOROLAC TROMETHAMINE 15 MG: 15 INJECTION, SOLUTION INTRAMUSCULAR; INTRAVENOUS at 11:04

## 2021-06-17 RX ADMIN — SODIUM CHLORIDE 1000 ML: 9 INJECTION, SOLUTION INTRAVENOUS at 11:27

## 2021-06-17 RX ADMIN — DIPHENHYDRAMINE HYDROCHLORIDE 25 MG: 50 INJECTION INTRAMUSCULAR; INTRAVENOUS at 11:05

## 2021-06-17 RX ADMIN — PROCHLORPERAZINE EDISYLATE 5 MG: 5 INJECTION INTRAMUSCULAR; INTRAVENOUS at 11:04

## 2021-06-17 RX ADMIN — IOPAMIDOL 90 ML: 755 INJECTION, SOLUTION INTRAVENOUS at 12:14

## 2021-06-17 ASSESSMENT — PAIN DESCRIPTION - PAIN TYPE: TYPE: ACUTE PAIN

## 2021-06-17 ASSESSMENT — PAIN DESCRIPTION - LOCATION: LOCATION: ABDOMEN;HEAD

## 2021-06-17 ASSESSMENT — PAIN SCALES - GENERAL
PAINLEVEL_OUTOF10: 10
PAINLEVEL_OUTOF10: 10

## 2021-06-17 NOTE — ED PROVIDER NOTES
[facility]  ED  Emergency Department Encounter  Emergency Medicine Resident     Pt Name: Lubna Littlejohn  MRN: [id number]  Ayazgfnomi 1995  Date of evaluation: 6/17/21  PCP:  Larisa Dominguez, [address]       Chief Complaint   Patient presents with    Vaginal Bleeding     pt states heavy bleeding for x17 days     Headache    Abdominal Cramping       HISTORY OFPRESENT ILLNESS  (Location/Symptom, Timing/Onset, Context/Setting, Quality, Duration, Modifying Brendalyn Retort.)      Lubna Littlejohn is a 22 y.o. female who presents with concern for 17 days of menstrual bleeding, patient states that her menstrual cycles are normally every 3 months, however this 1 has been ongoing for longer duration. Patient states that she recently was off of the Depo-Provera, within the last 6 months. Patient reports being sexually active, not on birth control, not using protection. Patient denies any vaginal discharge, any foul-smelling discharge, any dysuria, urinary frequency. Patient also has another complaint of a severe headache that is sudden onset nature, waking patient from her sleep, not associate with any nausea, vomiting, numbness, tingling. Patient states that these headaches been going on for couple of months, however they have been increasing in frequency. Patient has no familial history of aneurysms or subarachnoid hemorrhages. Patient is alert and oriented answering questions appropriately. Patient denies any chest pain, shortness of breath, nausea vomiting, fever, chills. PAST MEDICAL / SURGICAL / SOCIAL / FAMILY HISTORY      has a past medical history of Asthma and Headache(784.0). has a past surgical history that includes Tonsillectomy.      Social History     Socioeconomic History    Marital status: Single     Spouse name: Not on file    Number of children: Not on file    Years of education: Not on file    Highest education level: Not on file   Occupational History    Not on file   Tobacco Use    Smoking status: Current Every Day Smoker     Types: Cigars    Smokeless tobacco: Never Used    Tobacco comment: 1 black-n-mild cigar per day   Substance and Sexual Activity    Alcohol use: No    Drug use: No    Sexual activity: Not on file   Other Topics Concern    Not on file   Social History Narrative    Not on file     Social Determinants of Health     Financial Resource Strain:     Difficulty of Paying Living Expenses:    Food Insecurity:     Worried About Running Out of Food in the Last Year:     Ran Out of Food in the Last Year:    Transportation Needs:     Lack of Transportation (Medical):  Lack of Transportation (Non-Medical):    Physical Activity:     Days of Exercise per Week:     Minutes of Exercise per Session:    Stress:     Feeling of Stress :    Social Connections:     Frequency of Communication with Friends and Family:     Frequency of Social Gatherings with Friends and Family:     Attends [employer] Services:     Active Member of Clubs or Organizations:     Attends Club or Organization Meetings:     Marital Status:    Intimate Partner Violence:     Fear of Current or Ex-Partner:     Emotionally Abused:     Physically Abused:     Sexually Abused:        History reviewed. No pertinent family history. Allergies:  Patient has no known allergies. Home Medications:  Prior to Admission medications    Medication Sig Start Date End Date Taking?  Authorizing Provider   metroNIDAZOLE (FLAGYL) 500 MG tablet Take 1 tablet by mouth 2 times daily for 7 days 6/17/21 6/24/21 Yes Farideh Zapata, DO   ibuprofen (ADVIL;MOTRIN) 200 MG tablet Take 2 tablets by mouth every 8 hours as needed for Pain or Fever 5/17/21   Leann Mondragon MD   acetaminophen (TYLENOL) 500 MG tablet Take 2 tablets by mouth every 8 hours as needed for Pain 12/6/20 12/16/20  Susana Dang DO   ibuprofen (ADVIL;MOTRIN) 800 MG tablet Take 1 tablet by mouth every 8 hours as needed for Pain 12/6/20 12/16/20  Gideon Michele,    doxyLAMINE succinate (GNP SLEEP AID) 25 MG tablet Take 1 tablet by mouth nightly 7/11/19   Leopoldo Hooper MD   vitamin B-6 (PYRIDOXINE) 25 MG tablet Take 1 tablet by mouth 3 times daily 7/11/19   Leopoldo Hooper MD   Prenatal Vit-Fe Fumarate-FA (PRENATAL VITAMIN) 28-0.8 MG TABS tablet Take 1 tablet by mouth daily 7/1/19   Jacquie Sher MD       REVIEW OFSYSTEMS    (2-9 systems for level 4, 10 or more for level 5)      Constitutional ROS - No recent fevers, No recent chills  Neurological ROS - +  Headache, No Syncope  Opthalmologic ROS- No eye pain, No vision changes   ENT ROS - No sore throat, No congestion  Respiratory ROS - No cough, No shortness of breath  Cardiovascular ROS - No chest pain, No palpitations   Gastrointestinal ROS - No abdominal pain, No nausea, No vomiting  Genito-Urinary ROS - No dysuria, Nohematuria, +vaginal bleeding   Musculoskeletal ROS - No back pain, No neck pain  Dermatological ROS - No wound, No rash  PHYSICAL EXAM   (up to 7 for level 4, 8 or more forlevel 5)      INITIAL VITALS:   ED Triage Vitals [06/17/21 1018]   BP Temp Temp Source Pulse Resp SpO2 Height Weight   131/80 98.4 °F (36.9 °C) Oral 86 18 99 % -- --       Physical Exam  Constitutional:       Appearance: She is well-developed. HENT:      Head: Normocephalic and atraumatic. Eyes:      Pupils: Pupils are equal, round, and reactive to light. Comments: Peoples equal reactive, no nystagmus on exam   Cardiovascular:      Rate and Rhythm: Normal rate and regular rhythm. Pulmonary:      Effort: Pulmonary effort is normal. No respiratory distress. Breath sounds: Normal breath sounds. No stridor. Abdominal:      General: Bowel sounds are normal. There is no distension. Palpations: Abdomen is soft. Tenderness: There is no abdominal tenderness.       Comments: Abdomen is soft, nontender, nondistended, no rigidity, no guarding, no overlying skin changes rashes or lesions, no palpable hernias or masses   Musculoskeletal:         General: No deformity. Normal range of motion. Cervical back: Normal range of motion and neck supple. Skin:     General: Skin is warm and dry. Capillary Refill: Capillary refill takes less than 2 seconds. Neurological:      Mental Status: She is alert and oriented to person, place, and time. Comments: No focal neuro deficits, no numbness, no tingling, no strength deficits   Psychiatric:         Behavior: Behavior normal.         DIFFERENTIAL  DIAGNOSIS     PLAN (LABS / IMAGING / EKG):  Orders Placed This Encounter   Procedures    VAGINITIS DNA PROBE    C.trachomatis N.gonorrhoeae DNA    CT HEAD WO CONTRAST    CTA HEAD NECK W CONTRAST    CBC Auto Differential    Basic Metabolic Panel    Urinalysis with Microscopic    PREGNANCY, URINE       MEDICATIONS ORDERED:  Orders Placed This Encounter   Medications    0.9 % sodium chloride bolus    ketorolac (TORADOL) injection 15 mg    diphenhydrAMINE (BENADRYL) injection 25 mg    prochlorperazine (COMPAZINE) injection 5 mg    iopamidol (ISOVUE-370) 76 % injection 90 mL    metroNIDAZOLE (FLAGYL) 500 MG tablet     Sig: Take 1 tablet by mouth 2 times daily for 7 days     Dispense:  14 tablet     Refill:  0       DDX: Hyperemesis gravidarum, nausea and emesis, urinary tract infection, pseudotumor cerebri, subarachnoid hemorrhage, cerebral vascular aneurysm    Initial MDM/Plan: 22 y.o. female who presents with multiple complaints, 1 is vaginal bleeding for 17 days duration, second is headache that is been worsening in nature and instructing him to wake patient from sleeping, occurs mostly while patient is laying flat, patient does not have a history of aneurysms. Patient states it is maximal in onset at this initiating, then will dissipate. Patient is try Tylenol Motrin.   Patient also reports some blurry vision, there is concern for pseudotumor cerebri, patient states that her eye doctor recently talked her about her blurry vision and stated that she has not been wearing her eyeglasses. Patient denies any fever, chills, nausea or vomiting. Patient denies any numbness, tingling or other neuro deficits. We will plan for CT head, CTA, will assess patient for vaginal bleeding with pelvic exam, will take pelvic swabs, patient states that she is not going through more than 1 tampon or pad an hour, there is less concern for this to be acute hemorrhage versus abnormal uterine bleeding. Patient does have OB/GYN, were encouraged person to follow-up with OB/GYN. DIAGNOSTIC RESULTS / EMERGENCYDEPARTMENT COURSE / MDM     LABS:  Labs Reviewed   VAGINITIS DNA PROBE - Abnormal; Notable for the following components:       Result Value    Direct Exam POSITIVE for Gardnerella vaginalis. (*)     All other components within normal limits   CBC WITH AUTO DIFFERENTIAL - Abnormal; Notable for the following components:    RBC 3.73 (*)     Hemoglobin 10.7 (*)     Hematocrit 34.4 (*)     Eosinophils % 6 (*)     Absolute Eos # 0.50 (*)     All other components within normal limits   BASIC METABOLIC PANEL - Abnormal; Notable for the following components:    Glucose 100 (*)     Anion Gap 4 (*)     All other components within normal limits   URINALYSIS WITH MICROSCOPIC - Abnormal; Notable for the following components:    Urine Hgb SMALL (*)     All other components within normal limits   C.TRACHOMATIS N.GONORRHOEAE DNA   PREGNANCY, URINE         RADIOLOGY:  CT HEAD WO CONTRAST    Result Date: 6/17/2021  EXAMINATION: CT OF THE HEAD WITHOUT CONTRAST  6/17/2021 11:59 am TECHNIQUE: CT of the head was performed without the administration of intravenous contrast. Dose modulation, iterative reconstruction, and/or weight based adjustment of the mA/kV was utilized to reduce the radiation dose to as low as reasonably achievable. COMPARISON: None.  HISTORY: ORDERING SYSTEM PROVIDED HISTORY: headache - seever vision, continued headaches, nausea, vomiting. We will also give patient print out on pseudotumor cerebri for further investigation    [ISABELLA]   1401 Patient was offered lumbar puncture for diagnosis of opening pressures for consideration of pseudotumor cerebri, patient decided that this was not a process that she wanted to have done today, stated that she would talk it over with her mother. Patient will be given information regarding this on discharge, return precautions and encouraged to follow-up with primary care physician and return to the emergency department fever symptoms change. [ISABELLA]      ED Course User Index  [ISABELLA] Steven Zapata DO           PROCEDURES:  None    CONSULTS:  None    CRITICAL CARE:  Please see attending note    FINAL IMPRESSION      1.  Vaginal bleeding          DISPOSITION / PLAN     DISPOSITION Decision To Discharge 06/17/2021 02:00:20 PM      PATIENT REFERRED TO:  DEON Rincno  [address]  [place] [facility]  [phone]    Call   As needed    [facility] ED  [address] [facility]  [phone]  Call   As needed    [address] [id number]  Call in 1 day  for follow up for headaches and concern for pseudotumor cerebri      DISCHARGE MEDICATIONS:  Discharge Medication List as of 6/17/2021  2:04 PM      START taking these medications    Details   metroNIDAZOLE (FLAGYL) 500 MG tablet Take 1 tablet by mouth 2 times daily for 7 days, Disp-14 tablet, R-0Print             Shanita Austin DO  Emergency Medicine Resident    (Please note that portions of this note were completed with a voice recognition program.Efforts were made to edit the dictations but occasionally words are mis-transcribed.)        Shanita Austin DO  Resident  06/17/21 [phone]

## 2021-06-18 LAB
C TRACH DNA GENITAL QL NAA+PROBE: NEGATIVE
N. GONORRHOEAE DNA: ABNORMAL
SPECIMEN DESCRIPTION: ABNORMAL

## 2021-06-21 ENCOUNTER — HOSPITAL ENCOUNTER (EMERGENCY)
Age: 26
Discharge: HOME | End: 2021-06-21
Attending: EMERGENCY MEDICINE
Payer: MEDICARE

## 2021-06-21 ENCOUNTER — TELEPHONE (OUTPATIENT)
Dept: PHARMACY | Age: 26
End: 2021-06-21

## 2021-06-21 VITALS
RESPIRATION RATE: 18 BRPM | BODY MASS INDEX: 50.03 KG/M2 | SYSTOLIC BLOOD PRESSURE: 132 MMHG | TEMPERATURE: 97.9 F | HEIGHT: 61 IN | DIASTOLIC BLOOD PRESSURE: 89 MMHG | OXYGEN SATURATION: 100 % | WEIGHT: 265 LBS | HEART RATE: 83 BPM

## 2021-06-21 DIAGNOSIS — A64 STD (FEMALE): Primary | ICD-10-CM

## 2021-06-21 PROCEDURE — 99283 EMERGENCY DEPT VISIT LOW MDM: CPT

## 2021-06-21 PROCEDURE — 6360000002 HC RX W HCPCS: Performed by: STUDENT IN AN ORGANIZED HEALTH CARE EDUCATION/TRAINING PROGRAM

## 2021-06-21 PROCEDURE — 6370000000 HC RX 637 (ALT 250 FOR IP): Performed by: STUDENT IN AN ORGANIZED HEALTH CARE EDUCATION/TRAINING PROGRAM

## 2021-06-21 PROCEDURE — 96372 THER/PROPH/DIAG INJ SC/IM: CPT

## 2021-06-21 RX ORDER — DOXYCYCLINE HYCLATE 100 MG
100 TABLET ORAL ONCE
Status: COMPLETED | OUTPATIENT
Start: 2021-06-21 | End: 2021-06-21

## 2021-06-21 RX ORDER — CEFTRIAXONE 500 MG/1
500 INJECTION, POWDER, FOR SOLUTION INTRAMUSCULAR; INTRAVENOUS ONCE
Status: COMPLETED | OUTPATIENT
Start: 2021-06-21 | End: 2021-06-21

## 2021-06-21 RX ORDER — DOXYCYCLINE HYCLATE 100 MG
100 TABLET ORAL 2 TIMES DAILY
Qty: 14 TABLET | Refills: 0 | Status: SHIPPED | OUTPATIENT
Start: 2021-06-21 | End: 2021-06-28

## 2021-06-21 RX ADMIN — CEFTRIAXONE SODIUM 500 MG: 500 INJECTION, POWDER, FOR SOLUTION INTRAMUSCULAR; INTRAVENOUS at 18:30

## 2021-06-21 RX ADMIN — DOXYCYCLINE HYCLATE 100 MG: 100 TABLET, COATED ORAL at 18:30

## 2021-06-21 ASSESSMENT — ENCOUNTER SYMPTOMS
NAUSEA: 0
VOMITING: 0

## 2021-06-21 NOTE — ED PROVIDER NOTES
[facility]     Emergency Department     Faculty Attestation    I performed a history and physical examination of the patient and discussed management with the resident. I reviewed the residents note and agree with the documented findings and plan of care. Any areas of disagreement are noted on the chart. I was personally present for the key portions of any procedures. I have documented in the chart those procedures where I was not present during the key portions. I have reviewed the emergency nurses triage note. I agree with the chief complaint, past medical history, past surgical history, allergies, medications, social and family history as documented unless otherwise noted below. For Physician Assistant/ Nurse Practitioner cases/documentation I have personally evaluated this patient and have completed at least one if not all key elements of the E/M (history, physical exam, and MDM). Additional findings are as noted. I have personally seen and evaluated the patient. I find the patient's history and physical exam are consistent with the NP/PA documentation. I agree with the care provided, treatment rendered, disposition and follow-up plan. Critical Care     Tyesha Mark M.D.   Attending Emergency  Physician              Caryn Fernandez MD  06/21/21 [id number]

## 2021-06-21 NOTE — ED PROVIDER NOTES
[facility]  ED  Emergency Department Encounter  Emergency Medicine Resident     Pt Name: Hayden Carmona  MRN: [id number]  Ayazgfnomi 1995  Date ofevaluation: 6/21/21  PCP:  DEON Eason    CHIEF COMPLAINT       Chief Complaint   Patient presents with    Exposure to STD     needs treatment, received call for pos test result       HISTORY OF PRESENT ILLNESS  (Location/Symptom, Timing/Onset, Context/Setting, Quality, Duration, Modifying Factors, Severity, Associated signs/symptoms)     Hayden Carmona is a 22 y.o. female who presents for STD treatment. Patient was seen here recently tested for STDs. She has no complaints at this time but is seeking treatment for gonorrhea. Denies nausea vomiting, abdominal pain, urinary complaints or vaginal dilator discharge. She states that she was informed that she may have pseudotumor cerebri but has not been able follow-up for this. PAST MEDICAL / SURGICAL / SOCIAL / FAMILY HISTORY      has a past medical history of Asthma and Headache(784.0). has a past surgical history that includes Tonsillectomy.     Social History     Socioeconomic History    Marital status: Single     Spouse name: Not on file    Number of children: Not on file    Years of education: Not on file    Highest education level: Not on file   Occupational History    Not on file   Tobacco Use    Smoking status: Current Every Day Smoker     Types: Cigars    Smokeless tobacco: Never Used    Tobacco comment: 1 black-n-mild cigar per day   Substance and Sexual Activity    Alcohol use: No    Drug use: No    Sexual activity: Not on file   Other Topics Concern    Not on file   Social History Narrative    Not on file     Social Determinants of Health     Financial Resource Strain:     Difficulty of Paying Living Expenses:    Food Insecurity:     Worried About Running Out of Food in the Last Year:     [address] in the Last Year:    Transportation Needs:     Lack of Transportation (Medical):  Lack of Transportation (Non-Medical):    Physical Activity:     Days of Exercise per Week:     Minutes of Exercise per Session:    Stress:     Feeling of Stress :    Social Connections:     Frequency of Communication with Friends and Family:     Frequency of Social Gatherings with Friends and Family:     Attends [employer] Services:     Active Member of Clubs or Organizations:     Attends Club or Organization Meetings:     Marital Status:    Intimate Partner Violence:     Fear of Current or Ex-Partner:     Emotionally Abused:     Physically Abused:     Sexually Abused:        History reviewed. No pertinent family history. Allergies:  Patient has no known allergies. Home Medications:  Prior to Admission medications    Medication Sig Start Date End Date Taking?  Authorizing Provider   doxycycline hyclate (VIBRA-TABS) 100 MG tablet Take 1 tablet by mouth 2 times daily for 7 days 6/21/21 6/28/21 Yes Shailesh Quach, DO   metroNIDAZOLE (FLAGYL) 500 MG tablet Take 1 tablet by mouth 2 times daily for 7 days 6/17/21 6/24/21  [facility] HELGA Zapata, DO   ibuprofen (ADVIL;MOTRIN) 200 MG tablet Take 2 tablets by mouth every 8 hours as needed for Pain or Fever 5/17/21   Merline Morales MD   acetaminophen (TYLENOL) 500 MG tablet Take 2 tablets by mouth every 8 hours as needed for Pain 12/6/20 12/16/20  Juni Jim, DO   ibuprofen (ADVIL;MOTRIN) 800 MG tablet Take 1 tablet by mouth every 8 hours as needed for Pain 12/6/20 12/16/20  Juni Jim, DO   doxyLAMINE succinate (GNP SLEEP AID) 25 MG tablet Take 1 tablet by mouth nightly 7/11/19   Myrna Gardner MD   vitamin B-6 (PYRIDOXINE) 25 MG tablet Take 1 tablet by mouth 3 times daily 7/11/19   Myrna Gardner MD   Prenatal Vit-Fe Fumarate-FA (PRENATAL VITAMIN) 28-0.8 MG TABS tablet Take 1 tablet by mouth daily 7/1/19   Renay Reese MD       REVIEW OF SYSTEMS    (2-9 systems for level 4, 10 or more for level 5)      Review of Systems   Gastrointestinal: Negative for nausea and vomiting. Genitourinary: Negative for dysuria, frequency, hematuria, vaginal bleeding and vaginal discharge. Allergic/Immunologic: Negative for environmental allergies and food allergies. PHYSICAL EXAM   (up to 7 for level 4, 8 or more for level 5)      INITIAL VITALS:   /89   Pulse 83   Temp 97.9 °F (36.6 °C) (Tympanic)   Resp 18   Ht 5' 1\" (1.549 m)   Wt 265 lb (120.2 kg)   LMP 06/01/2021   SpO2 100%   BMI 50.07 kg/m²     Physical Exam  Vitals and nursing note reviewed. Constitutional:       General: She is not in acute distress. Appearance: Normal appearance. She is obese. She is not ill-appearing, toxic-appearing or diaphoretic. HENT:      Head: Normocephalic and atraumatic. Eyes:      General: No scleral icterus. Conjunctiva/sclera: Conjunctivae normal.   Cardiovascular:      Rate and Rhythm: Normal rate and regular rhythm. Pulmonary:      Effort: Pulmonary effort is normal. No respiratory distress. Breath sounds: Normal breath sounds. No stridor. No wheezing, rhonchi or rales. Abdominal:      General: There is no distension. Palpations: Abdomen is soft. There is no mass. Tenderness: There is no abdominal tenderness. There is no guarding or rebound. Musculoskeletal:      Cervical back: Neck supple. Skin:     General: Skin is warm and dry. Coloration: Skin is not jaundiced. Neurological:      General: No focal deficit present. Mental Status: She is alert and oriented to person, place, and time. Psychiatric:         Mood and Affect: Mood normal.         Behavior: Behavior normal.         DIAGNOSTICS     PLAN (LABS / IMAGING / EKG):  No orders of the defined types were placed in this encounter.       MEDICATIONS ORDERED:  Orders Placed This Encounter   Medications    cefTRIAXone (ROCEPHIN) injection 500 mg     Order Specific Question:   Antimicrobial Indications     Answer:   STD infection    doxycycline hyclate (VIBRA-TABS) tablet 100 mg     Order Specific Question:   Antimicrobial Indications     Answer:   STD infection    doxycycline hyclate (VIBRA-TABS) 100 MG tablet     Sig: Take 1 tablet by mouth 2 times daily for 7 days     Dispense:  14 tablet     Refill:  0       DIAGNOSTIC RESULTS / EMERGENCYDEPARTMENT COURSE / MDM     LABS:  No results found for this visit on 06/21/21. RADIOLOGY:  No orders to display        EMERGENCY DEPARTMENT COURSE:         MDM: 60-year-old female presenting for STD treatment. Will provide doxycycline and Rocephin. She was counseled on the need for close follow-up with her neurology if she does indeed have a pseudotumor is a side threatening diagnosis.     PROCEDURES:  None  CONSULTS:  None    FINAL IMPRESSION      1. STD (female)          DISPOSITION / PLAN     DISPOSITION Decision To Discharge 06/21/2021 06:32:26 PM      PATIENT REFERRED TO:  DEON Morris  [address] [place] [facility]  [phone]    Schedule an appointment as soon as possible for a visit   Follow up of this visit    [facility]  [address] [facility]  [phone]  Go to   If symptoms worsen    [address] [address]  [phone]  Schedule an appointment as soon as possible for a visit   Follow up of this visit      DISCHARGE MEDICATIONS:  New Prescriptions    DOXYCYCLINE HYCLATE (VIBRA-TABS) 100 MG TABLET    Take 1 tablet by mouth 2 times daily for 7 days       Jos Sarkar DO  Emergency Medicine Resident  [address] [facility]    (Please note that portions of this note were completed with a voice recognition program.  Efforts were made to edit thedictations but occasionally words are mis-transcribed.)       Jos Sarkar DO  Resident  06/21/21 [id number]

## 2021-06-21 NOTE — TELEPHONE ENCOUNTER
CLINICAL PHARMACY NOTE:  Telephone Follow-up for Positive STD Test    At the time of Justice Bills visit to [facility] Emergency Department on 6/17/2021 STD testing was performed. DNA testing was positive for Gonorrhea. Spoke to patient on 6/21/2021 to review test results and regarding need to start oral antibiotic therapy and receive an injection to treat the infection. Advised patient to go to the Health Department, their local urgent care/ED, or return to the [facility] Department for treatment. Instructed patient to abstain from sexual intercourse until receiving these antibiotics  and then for 7 days after treatment. Patient also advised to inform any partners that they will need to be tested as well. Patient verbally expressed understanding of above plan.     Nelia Gutierrez RPh, LYNDSAYP  Clinical Pharmacist Medication Management  6/21/2021  9:38 AM       For Pharmacy Admin Tracking Only     Intervention Detail: Adherence Monitoring: STD f/u   Total # of Interventions Recommended: 1   Total # of Interventions Accepted: 1   Time Spent (min): 10

## 2021-08-14 ENCOUNTER — APPOINTMENT (OUTPATIENT)
Dept: GENERAL RADIOLOGY | Age: 26
End: 2021-08-14
Payer: MEDICARE

## 2021-08-14 ENCOUNTER — HOSPITAL ENCOUNTER (EMERGENCY)
Age: 26
Discharge: HOME | End: 2021-08-15
Attending: EMERGENCY MEDICINE
Payer: MEDICARE

## 2021-08-14 VITALS
TEMPERATURE: 99.7 F | HEIGHT: 61 IN | WEIGHT: 249.7 LBS | SYSTOLIC BLOOD PRESSURE: 130 MMHG | DIASTOLIC BLOOD PRESSURE: 87 MMHG | HEART RATE: 102 BPM | OXYGEN SATURATION: 99 % | RESPIRATION RATE: 16 BRPM | BODY MASS INDEX: 47.14 KG/M2

## 2021-08-14 DIAGNOSIS — J45.20 MILD INTERMITTENT ASTHMA WITHOUT COMPLICATION: Primary | ICD-10-CM

## 2021-08-14 PROCEDURE — 93005 ELECTROCARDIOGRAM TRACING: CPT | Performed by: EMERGENCY MEDICINE

## 2021-08-14 PROCEDURE — 99282 EMERGENCY DEPT VISIT SF MDM: CPT

## 2021-08-14 PROCEDURE — 71046 X-RAY EXAM CHEST 2 VIEWS: CPT

## 2021-08-14 RX ORDER — HYDROMORPHONE HYDROCHLORIDE 1 MG/ML
1 INJECTION, SOLUTION INTRAMUSCULAR; INTRAVENOUS; SUBCUTANEOUS ONCE
Status: DISCONTINUED | OUTPATIENT
Start: 2021-08-14 | End: 2021-08-14

## 2021-08-14 RX ORDER — PREDNISONE 50 MG/1
50 TABLET ORAL DAILY
Qty: 5 TABLET | Refills: 0 | Status: SHIPPED | OUTPATIENT
Start: 2021-08-14 | End: 2021-08-19

## 2021-08-14 RX ORDER — ALBUTEROL SULFATE 90 UG/1
2 AEROSOL, METERED RESPIRATORY (INHALATION) 4 TIMES DAILY PRN
Qty: 1 INHALER | Refills: 2 | Status: SHIPPED | OUTPATIENT
Start: 2021-08-14

## 2021-08-14 RX ORDER — METOCLOPRAMIDE HYDROCHLORIDE 5 MG/ML
10 INJECTION INTRAMUSCULAR; INTRAVENOUS ONCE
Status: DISCONTINUED | OUTPATIENT
Start: 2021-08-14 | End: 2021-08-14

## 2021-08-14 RX ORDER — 0.9 % SODIUM CHLORIDE 0.9 %
1000 INTRAVENOUS SOLUTION INTRAVENOUS ONCE
Status: DISCONTINUED | OUTPATIENT
Start: 2021-08-14 | End: 2021-08-14

## 2021-08-14 RX ORDER — NAPROXEN 500 MG/1
TABLET ORAL
COMMUNITY
Start: 2021-07-22 | End: 2022-06-03

## 2021-08-14 ASSESSMENT — PAIN SCALES - GENERAL: PAINLEVEL_OUTOF10: 9

## 2021-08-15 NOTE — ED PROVIDER NOTES
normal.      Nose: Nose normal.   Eyes:      Conjunctiva/sclera: Conjunctivae normal.   Cardiovascular:      Rate and Rhythm: Normal rate. Pulmonary:      Effort: Pulmonary effort is normal.   Abdominal:      General: Abdomen is flat. Skin:     General: Skin is dry. Neurological:      Mental Status: She is alert. Mental status is at baseline. Psychiatric:         Mood and Affect: Mood normal.         Behavior: Behavior normal.         MEDICAL DECISION MAKING:   The patient is hemodynamically stable, afebrile, nontoxic-appearing. Physical exam notable for mild wheezes bilaterally. Based on history and exam likely asthma exacerbation. ED plan for chest x-ray, reassess. DIAGNOSTIC RESULTS   EKG:All EKG's are interpreted by the Emergency Department Physician who either signs or Co-signs this chart in the absence of a cardiologist.        RADIOLOGY:All plain film, CT, MRI, and formal ultrasound images (except ED bedside ultrasound) are read by the radiologist, see reports below, unless otherwisenoted in MDM or here. XR CHEST (2 VW)   Final Result   Clear lungs. No acute cardiopulmonary abnormality. LABS: All lab results were reviewed by myself, and all abnormals are listed below. Labs Reviewed - No data to display    EMERGENCY DEPARTMENTCOURSE:   Patient did well in the ED. X-ray negative for infiltrate. Given first dose of prednisone in the ED. Given Rx for prednisone for home. No further work-up indicated at this time. Nursing notes reviewed. At this time this is what I find, the patient appears well and does not appear sick or toxic. I gave my usual and customary discussion of the risks and benefits of discharge versus admission. I answered the patient's questions. I gave the patient strict return precautions. Patient expressed understanding of the discharge instructions. Dictated but not reviewed.         Vitals:    Vitals:    08/14/21 2141   BP: 130/87   Pulse: 102   Resp: 16   Temp: 99.7 °F (37.6 °C)   TempSrc: Oral   SpO2: 99%   Weight: 249 lb 11.2 oz (113.3 kg)   Height: 5' 1\" (1.549 m)       The patient was given the following medications while in the emergency department:  Orders Placed This Encounter   Medications    DISCONTD: 0.9 % sodium chloride bolus    DISCONTD: metoclopramide (REGLAN) injection 10 mg    DISCONTD: HYDROmorphone HCl PF (DILAUDID) injection 1 mg    predniSONE (DELTASONE) 50 MG tablet     Sig: Take 1 tablet by mouth daily for 5 days     Dispense:  5 tablet     Refill:  0    albuterol sulfate HFA (VENTOLIN HFA) 108 (90 Base) MCG/ACT inhaler     Sig: Inhale 2 puffs into the lungs 4 times daily as needed for Wheezing     Dispense:  1 Inhaler     Refill:  2     CONSULTS:  None    FINAL IMPRESSION      1.  Mild intermittent asthma without complication          DISPOSITION/PLAN   DISPOSITION Decision To Discharge 08/14/2021 11:31:58 PM      PATIENT REFERRED TO:  DEON Green  [address]  [phone]    In 2 days      DISCHARGE MEDICATIONS:  Discharge Medication List as of 8/14/2021 11:43 PM      START taking these medications    Details   predniSONE (DELTASONE) 50 MG tablet Take 1 tablet by mouth daily for 5 days, Disp-5 tablet, R-0Normal      albuterol sulfate HFA (VENTOLIN HFA) 108 (90 Base) MCG/ACT inhaler Inhale 2 puffs into the lungs 4 times daily as needed for Wheezing, Disp-1 Inhaler, R-2Normal           Jefferson Meyer MD  Attending Emergency Physician                    Grzegorz Mendes MD  08/15/21 1950

## 2021-08-16 LAB
EKG ATRIAL RATE: 95 BPM
EKG P AXIS: 62 DEGREES
EKG P-R INTERVAL: 152 MS
EKG Q-T INTERVAL: 332 MS
EKG QRS DURATION: 90 MS
EKG QTC CALCULATION (BAZETT): 417 MS
EKG R AXIS: -5 DEGREES
EKG T AXIS: 15 DEGREES
EKG VENTRICULAR RATE: 95 BPM

## 2021-08-16 PROCEDURE — 93010 ELECTROCARDIOGRAM REPORT: CPT | Performed by: INTERNAL MEDICINE

## 2021-08-19 ENCOUNTER — APPOINTMENT (OUTPATIENT)
Dept: GENERAL RADIOLOGY | Age: 26
End: 2021-08-19
Payer: MEDICARE

## 2021-08-19 ENCOUNTER — HOSPITAL ENCOUNTER (EMERGENCY)
Age: 26
Discharge: HOME | End: 2021-08-19
Attending: EMERGENCY MEDICINE
Payer: MEDICARE

## 2021-08-19 VITALS
RESPIRATION RATE: 16 BRPM | WEIGHT: 250 LBS | TEMPERATURE: 99.6 F | DIASTOLIC BLOOD PRESSURE: 77 MMHG | HEART RATE: 100 BPM | HEIGHT: 61 IN | SYSTOLIC BLOOD PRESSURE: 112 MMHG | OXYGEN SATURATION: 100 % | BODY MASS INDEX: 47.2 KG/M2

## 2021-08-19 DIAGNOSIS — J18.9 MULTIFOCAL PNEUMONIA: Primary | ICD-10-CM

## 2021-08-19 LAB
ABSOLUTE EOS #: 0 K/UL (ref 0–0.4)
ABSOLUTE IMMATURE GRANULOCYTE: 0 K/UL (ref 0–0.3)
ABSOLUTE LYMPH #: 1.13 K/UL (ref 1–4.8)
ABSOLUTE MONO #: 0.41 K/UL (ref 0.2–0.8)
ANION GAP SERPL CALCULATED.3IONS-SCNC: 14 MMOL/L (ref 9–17)
BASOPHILS # BLD: 0 %
BASOPHILS ABSOLUTE: 0 K/UL (ref 0–0.2)
BUN BLDV-MCNC: 14 MG/DL (ref 6–20)
BUN/CREAT BLD: 14 (ref 9–20)
CALCIUM SERPL-MCNC: 8.4 MG/DL (ref 8.6–10.4)
CHLORIDE BLD-SCNC: 104 MMOL/L (ref 98–107)
CO2: 21 MMOL/L (ref 20–31)
CREAT SERPL-MCNC: 1.02 MG/DL (ref 0.5–0.9)
D-DIMER QUANTITATIVE: 0.49 MG/L FEU (ref 0–0.59)
DIFFERENTIAL TYPE: ABNORMAL
EOSINOPHILS RELATIVE PERCENT: 0 % (ref 1–4)
GFR AFRICAN AMERICAN: >60 ML/MIN
GFR NON-AFRICAN AMERICAN: >60 ML/MIN
GFR SERPL CREATININE-BSD FRML MDRD: ABNORMAL ML/MIN/{1.73_M2}
GFR SERPL CREATININE-BSD FRML MDRD: ABNORMAL ML/MIN/{1.73_M2}
GLUCOSE BLD-MCNC: 71 MG/DL (ref 70–99)
HCG QUALITATIVE: NEGATIVE
HCT VFR BLD CALC: 38.7 % (ref 36.3–47.1)
HEMOGLOBIN: 11.4 G/DL (ref 11.9–15.1)
IMMATURE GRANULOCYTES: 0 %
LYMPHOCYTES # BLD: 25 % (ref 24–44)
MAGNESIUM: 1.8 MG/DL (ref 1.6–2.6)
MCH RBC QN AUTO: 25.4 PG (ref 25.2–33.5)
MCHC RBC AUTO-ENTMCNC: 29.5 G/DL (ref 28.4–34.8)
MCV RBC AUTO: 86.4 FL (ref 82.6–102.9)
MONOCYTES # BLD: 9 % (ref 1–7)
NRBC AUTOMATED: ABNORMAL PER 100 WBC
PDW BLD-RTO: 13.7 % (ref 11.8–14.4)
PLATELET # BLD: ABNORMAL K/UL (ref 130–400)
PLATELET ESTIMATE: ABNORMAL
PMV BLD AUTO: ABNORMAL FL (ref 6–12)
POTASSIUM SERPL-SCNC: 3.7 MMOL/L (ref 3.7–5.3)
RBC # BLD: 4.48 M/UL (ref 3.95–5.11)
RBC # BLD: ABNORMAL 10*6/UL
SARS-COV-2, RAPID: NOT DETECTED
SEG NEUTROPHILS: 66 % (ref 36–66)
SEGMENTED NEUTROPHILS ABSOLUTE COUNT: 2.96 K/UL (ref 1.8–7.7)
SODIUM BLD-SCNC: 139 MMOL/L (ref 135–144)
SPECIMEN DESCRIPTION: NORMAL
TROPONIN INTERP: NORMAL
TROPONIN T: NORMAL NG/ML
TROPONIN, HIGH SENSITIVITY: <6 NG/L (ref 0–14)
WBC # BLD: 4.5 K/UL (ref 3.5–11.3)
WBC # BLD: ABNORMAL 10*3/UL

## 2021-08-19 PROCEDURE — 80048 BASIC METABOLIC PNL TOTAL CA: CPT

## 2021-08-19 PROCEDURE — 93005 ELECTROCARDIOGRAM TRACING: CPT | Performed by: EMERGENCY MEDICINE

## 2021-08-19 PROCEDURE — 83735 ASSAY OF MAGNESIUM: CPT

## 2021-08-19 PROCEDURE — 84484 ASSAY OF TROPONIN QUANT: CPT

## 2021-08-19 PROCEDURE — 85025 COMPLETE CBC W/AUTO DIFF WBC: CPT

## 2021-08-19 PROCEDURE — 87635 SARS-COV-2 COVID-19 AMP PRB: CPT

## 2021-08-19 PROCEDURE — 71045 X-RAY EXAM CHEST 1 VIEW: CPT

## 2021-08-19 PROCEDURE — 2580000003 HC RX 258: Performed by: EMERGENCY MEDICINE

## 2021-08-19 PROCEDURE — 85379 FIBRIN DEGRADATION QUANT: CPT

## 2021-08-19 PROCEDURE — 99282 EMERGENCY DEPT VISIT SF MDM: CPT

## 2021-08-19 PROCEDURE — U0005 INFEC AGEN DETEC AMPLI PROBE: HCPCS

## 2021-08-19 PROCEDURE — U0003 INFECTIOUS AGENT DETECTION BY NUCLEIC ACID (DNA OR RNA); SEVERE ACUTE RESPIRATORY SYNDROME CORONAVIRUS 2 (SARS-COV-2) (CORONAVIRUS DISEASE [COVID-19]), AMPLIFIED PROBE TECHNIQUE, MAKING USE OF HIGH THROUGHPUT TECHNOLOGIES AS DESCRIBED BY CMS-2020-01-R: HCPCS

## 2021-08-19 PROCEDURE — 84703 CHORIONIC GONADOTROPIN ASSAY: CPT

## 2021-08-19 RX ORDER — AZITHROMYCIN 250 MG/1
250 TABLET, FILM COATED ORAL SEE ADMIN INSTRUCTIONS
Qty: 6 TABLET | Refills: 0 | Status: SHIPPED | OUTPATIENT
Start: 2021-08-19 | End: 2021-08-24

## 2021-08-19 RX ORDER — AMOXICILLIN AND CLAVULANATE POTASSIUM 875; 125 MG/1; MG/1
1 TABLET, FILM COATED ORAL 2 TIMES DAILY
Qty: 20 TABLET | Refills: 0 | Status: SHIPPED | OUTPATIENT
Start: 2021-08-19 | End: 2021-08-29

## 2021-08-19 RX ORDER — ONDANSETRON 4 MG/1
4 TABLET, ORALLY DISINTEGRATING ORAL EVERY 8 HOURS PRN
Qty: 20 TABLET | Refills: 0 | Status: SHIPPED | OUTPATIENT
Start: 2021-08-19 | End: 2022-06-03

## 2021-08-19 RX ORDER — 0.9 % SODIUM CHLORIDE 0.9 %
1000 INTRAVENOUS SOLUTION INTRAVENOUS ONCE
Status: COMPLETED | OUTPATIENT
Start: 2021-08-19 | End: 2021-08-19

## 2021-08-19 RX ADMIN — SODIUM CHLORIDE 1000 ML: 9 INJECTION, SOLUTION INTRAVENOUS at 13:34

## 2021-08-19 ASSESSMENT — ENCOUNTER SYMPTOMS
EYE REDNESS: 0
VOMITING: 0
COUGH: 1
COLOR CHANGE: 0
EYE DISCHARGE: 0
NAUSEA: 0
SHORTNESS OF BREATH: 1
SORE THROAT: 0
DIARRHEA: 0
RHINORRHEA: 0

## 2021-08-19 ASSESSMENT — PAIN SCALES - GENERAL: PAINLEVEL_OUTOF10: 8

## 2021-08-19 NOTE — ED PROVIDER NOTES
EMERGENCY DEPARTMENT ENCOUNTER    Pt Name: Melissa Barrios  MRN: [id number]  [place] 1995  Date of evaluation: 8/19/21  CHIEF COMPLAINT       Chief Complaint   Patient presents with    Shortness of Breath     seen 2 days ago no improvement    Dizziness    Fatigue     HISTORY OF PRESENT ILLNESS   This is a 79-year-old female that presents with complaints of cough, shortness of breath, dizziness and fatigue. She was seen here few days ago, diagnosed with an asthma exacerbation and sent home with some steroids and inhalers. The patient states that her symptoms were not improving. She states that she is uncertain exactly what is going on. She complains of continued shortness of breath and is not feeling well. REVIEW OF SYSTEMS     Review of Systems   Constitutional: Negative for chills and fever. HENT: Negative for rhinorrhea and sore throat. Eyes: Negative for discharge, redness and visual disturbance. Respiratory: Positive for cough and shortness of breath. Cardiovascular: Negative for chest pain, palpitations and leg swelling. Gastrointestinal: Negative for diarrhea, nausea and vomiting. Genitourinary: Negative for dysuria and hematuria. Musculoskeletal: Negative for arthralgias, myalgias and neck pain. Skin: Negative for color change and rash. Neurological: Negative for seizures, weakness and headaches. Psychiatric/Behavioral: Negative for hallucinations, self-injury and suicidal ideas. PASTMEDICAL HISTORY     Past Medical History:   Diagnosis Date    Asthma     Headache(784.0)     Pseudotumor      Past Problem List  There is no problem list on file for this patient.     SURGICAL HISTORY       Past Surgical History:   Procedure Laterality Date    TONSILLECTOMY       CURRENT MEDICATIONS       Previous Medications    ALBUTEROL SULFATE HFA (VENTOLIN HFA) 108 (90 BASE) MCG/ACT INHALER    Inhale 2 puffs into the lungs 4 times daily as needed for Wheezing    NAPROXEN (NAPROSYN) 500 MG TABLET    take 1 tablet by mouth twice a day if needed for pain    PREDNISONE (DELTASONE) 50 MG TABLET    Take 1 tablet by mouth daily for 5 days     ALLERGIES     has No Known Allergies. FAMILY HISTORY     has no family status information on file. SOCIAL HISTORY       Social History     Tobacco Use    Smoking status: Former Smoker     Types: Cigars    Smokeless tobacco: Never Used    Tobacco comment: 1 black-n-mild cigar per day   Vaping Use    Vaping Use: Never used   Substance Use Topics    Alcohol use: No    Drug use: No     PHYSICAL EXAM     INITIAL VITALS: /77   Pulse 100   Temp 99.6 °F (37.6 °C) (Oral)   Resp 16   Ht 5' 1\" (1.549 m)   Wt 250 lb (113.4 kg)   LMP 08/12/2021 (Exact Date)   SpO2 100%   BMI 47.24 kg/m²    Physical Exam  Constitutional:       Appearance: Normal appearance. She is well-developed. She is not ill-appearing or toxic-appearing. HENT:      Head: Normocephalic and atraumatic. Eyes:      Conjunctiva/sclera: Conjunctivae normal.      Pupils: Pupils are equal, round, and reactive to light. Neck:      Trachea: Trachea normal.   Cardiovascular:      Rate and Rhythm: Normal rate and regular rhythm. Heart sounds: S1 normal and S2 normal. No murmur heard. Pulmonary:      Effort: Pulmonary effort is normal. Tachypnea present. No accessory muscle usage or respiratory distress. Breath sounds: Normal breath sounds. Chest:      Chest wall: No deformity or tenderness. Abdominal:      General: Bowel sounds are normal. There is no distension or abdominal bruit. Palpations: Abdomen is not rigid. Tenderness: There is no abdominal tenderness. There is no guarding or rebound. Musculoskeletal:      Cervical back: Normal range of motion and neck supple. Skin:     General: Skin is warm. Findings: No rash. Neurological:      Mental Status: She is alert and oriented to person, place, and time. GCS: GCS eye subscore is 4. GCS verbal subscore is 5. GCS motor subscore is 6. Psychiatric:         Speech: Speech normal.         MEDICAL DECISION MAKINyear-old presents with complaints of cough and shortness of breath. Plan is basic labs cardiac enzymes D-dimer Covid swab and reevaluation. 3:57 PM EDT  Patient's Covid screen was negative, her x-ray shows evidence of multifocal pneumonia, she is not hypoxemic, at this time with her age I believe she would be stable for outpatient treatment, continue steroids, we will add antibiotics. Something for nausea and return if symptoms worsen or change. CRITICAL CARE:       PROCEDURES:    Procedures    DIAGNOSTIC RESULTS   EKG:All EKG's are interpreted by the Emergency Department Physician who either signs or Co-signs this chart in the absence of a cardiologist.    Patient's EKG shows sinus tachycardia with a rate of 103 FL QRS QTC intervals unremarkable the patient has normal axis no ST elevations or depressions, no significant T wave changes. Nonspecific EKG. RADIOLOGY:All plain film, CT, MRI, and formal ultrasound images (except ED bedside ultrasound) are read by the radiologist, see reports below, unless otherwisenoted in MDM or here. XR CHEST PORTABLE   Final Result   New ill-defined parenchymal opacities in the right perihilar region and right   base. Nonspecific airspace opacities could reflect multifocal pneumonia. Correlate clinically and follow-up chest radiographs in 6-8 weeks would be   recommended after appropriate therapy has been administered to ensure   resolution. LABS: All lab results were reviewed by myself, and all abnormals are listed below.   Labs Reviewed   BASIC METABOLIC PANEL - Abnormal; Notable for the following components:       Result Value    CREATININE 1.02 (*)     Calcium 8.4 (*)     All other components within normal limits   CBC WITH AUTO DIFFERENTIAL - Abnormal; Notable for the following components:    Hemoglobin 11.4 (*) Monocytes 9 (*)     Eosinophils % 0 (*)     All other components within normal limits   COVID-19, RAPID   D-DIMER, QUANTITATIVE   MAGNESIUM   TROPONIN   HCG, SERUM, QUALITATIVE   COVID-19       EMERGENCY DEPARTMENTCOURSE:         Vitals:    Vitals:    08/19/21 1152 08/19/21 1155   BP: 112/77    Pulse: 100    Resp: 16    Temp: 99.6 °F (37.6 °C)    TempSrc: Oral    SpO2: 100%    Weight: 249 lb (112.9 kg) 250 lb (113.4 kg)   Height: 5' 1\" (1.549 m)        The patient was given the following medications while in the emergency department:  Orders Placed This Encounter   Medications    0.9 % sodium chloride bolus    amoxicillin-clavulanate (AUGMENTIN) 875-125 MG per tablet     Sig: Take 1 tablet by mouth 2 times daily for 10 days     Dispense:  20 tablet     Refill:  0    azithromycin (ZITHROMAX) 250 MG tablet     Sig: Take 1 tablet by mouth See Admin Instructions for 5 days 500mg on day 1 followed by 250mg on days 2 - 5     Dispense:  6 tablet     Refill:  0    ondansetron (ZOFRAN ODT) 4 MG disintegrating tablet     Sig: Take 1 tablet by mouth every 8 hours as needed for Nausea     Dispense:  20 tablet     Refill:  0     CONSULTS:  None    FINAL IMPRESSION      1.  Multifocal pneumonia          DISPOSITION/PLAN   DISPOSITION Decision To Discharge 08/19/2021 03:45:12 PM      PATIENT REFERRED TO:  Lubna Ogden, APRN  [address]  [phone]    Schedule an appointment as soon as possible for a visit in 2 days      DISCHARGE MEDICATIONS:  New Prescriptions    AMOXICILLIN-CLAVULANATE (AUGMENTIN) 875-125 MG PER TABLET    Take 1 tablet by mouth 2 times daily for 10 days    AZITHROMYCIN (ZITHROMAX) 250 MG TABLET    Take 1 tablet by mouth See Admin Instructions for 5 days 500mg on day 1 followed by 250mg on days 2 - 5    ONDANSETRON (ZOFRAN ODT) 4 MG DISINTEGRATING TABLET    Take 1 tablet by mouth every 8 hours as needed for Nausea     Allen Black MD  Attending Emergency Physician                   Darien Bailon MD  08/19/21 9948

## 2021-08-20 ENCOUNTER — CARE COORDINATION (OUTPATIENT)
Dept: CARE COORDINATION | Age: 26
End: 2021-08-20

## 2021-08-20 LAB
EKG ATRIAL RATE: 103 BPM
EKG P AXIS: 48 DEGREES
EKG P-R INTERVAL: 150 MS
EKG Q-T INTERVAL: 310 MS
EKG QRS DURATION: 82 MS
EKG QTC CALCULATION (BAZETT): 406 MS
EKG R AXIS: -7 DEGREES
EKG T AXIS: 1 DEGREES
EKG VENTRICULAR RATE: 103 BPM
SARS-COV-2: NORMAL
SARS-COV-2: NOT DETECTED
SOURCE: NORMAL

## 2021-08-20 PROCEDURE — 93010 ELECTROCARDIOGRAM REPORT: CPT | Performed by: INTERNAL MEDICINE

## 2021-08-20 NOTE — CARE COORDINATION
Outreach call for follow up ED visit/COVID monitoring. COVID test negative. Educated patient about risk for severe COVID-19 due to risk factors according to CDC guidelines. ACM reviewed discharge instructions, medical action plan and red flag symptoms with the patient who verbalized understanding. Discussed COVID vaccination status: Yes. Education provided on COVID-19 vaccination as appropriate. Discussed exposure protocols and quarantine with CDC Guidelines. Patient was given an opportunity to verbalize any questions and concerns and agrees to contact ACM or health care provider for questions related to their healthcare. Future Appointments   Date Time Provider Amanda Choudhury   10/12/2021  2:30 PM Lasha Michele MD Neuro [address] [facility]  People with COVID-19 may have no symptoms, mild symptoms, such as fever, cough, and shortness of breath or they may have more severe illness, developing severe and fatal pneumonia. As a result, Advance Care Planning with attention to naming a health care decision maker (someone you trust to make healthcare decisions for you if you could not speak for yourself) and sharing other health care preferences is important BEFORE a possible health crisis. Please contact your Primary Care Provider to discuss Advance Care Planning. Preventing the Spread of Coronavirus Disease 2019 in Homes and Residential Communities  For the most recent information go to [employer]    Prevention steps for People with confirmed or suspected COVID-19 (including persons under investigation) who do not need to be hospitalized  and   People with confirmed COVID-19 who were hospitalized and determined to be medically stable to go home    Your healthcare provider and public health staff will evaluate whether you can be cared for at home.  If it is determined that you do not need to be hospitalized and can be isolated at home, you will be monitored by staff from your local or state health department. You should follow the prevention steps below until a healthcare provider or local or state health department says you can return to your normal activities. Stay home except to get medical care  People who are mildly ill with COVID-19 are able to isolate at home during their illness. You should restrict activities outside your home, except for getting medical care. Do not go to work, school, or public areas. Avoid using public transportation, ride-sharing, or taxis. Separate yourself from other people and animals in your home  People: As much as possible, you should stay in a specific room and away from other people in your home. Also, you should use a separate bathroom, if available. Animals: You should restrict contact with pets and other animals while you are sick with COVID-19, just like you would around other people. Although there have not been reports of pets or other animals becoming sick with COVID-19, it is still recommended that people sick with COVID-19 limit contact with animals until more information is known about the virus. When possible, have another member of your household care for your animals while you are sick. If you are sick with COVID-19, avoid contact with your pet, including petting, snuggling, being kissed or licked, and sharing food. If you must care for your pet or be around animals while you are sick, wash your hands before and after you interact with pets and wear a facemask. Call ahead before visiting your doctor  If you have a medical appointment, call the healthcare provider and tell them that you have or may have COVID-19. This will help the healthcare providers office take steps to keep other people from getting infected or exposed.   Wear a facemask  You should wear a facemask when you are around other people (e.g., sharing a room or vehicle) or pets and before you enter a healthcare providers office. If you are not able to wear a facemask (for example, because it causes trouble breathing), then people who live with you should not stay in the same room with you, or they should wear a facemask if they enter your room. Cover your coughs and sneezes  Cover your mouth and nose with a tissue when you cough or sneeze. Throw used tissues in a lined trash can. Immediately wash your hands with soap and water for at least 20 seconds or, if soap and water are not available, clean your hands with an alcohol-based hand  that contains at least 60% alcohol. Clean your hands often  Wash your hands often with soap and water for at least 20 seconds, especially after blowing your nose, coughing, or sneezing; going to the bathroom; and before eating or preparing food. If soap and water are not readily available, use an alcohol-based hand  with at least 60% alcohol, covering all surfaces of your hands and rubbing them together until they feel dry. Soap and water are the best option if hands are visibly dirty. Avoid touching your eyes, nose, and mouth with unwashed hands. Avoid sharing personal household items  You should not share dishes, drinking glasses, cups, eating utensils, towels, or bedding with other people or pets in your home. After using these items, they should be washed thoroughly with soap and water. Clean all high-touch surfaces everyday  High touch surfaces include counters, tabletops, doorknobs, bathroom fixtures, toilets, phones, keyboards, tablets, and bedside tables. Also, clean any surfaces that may have blood, stool, or body fluids on them. Use a household cleaning spray or wipe, according to the label instructions. Labels contain instructions for safe and effective use of the cleaning product including precautions you should take when applying the product, such as wearing gloves and making sure you have good ventilation during use of the product.   Monitor your

## 2021-08-25 NOTE — TELEPHONE ENCOUNTER
CLINICAL PHARMACY NOTE:  Telephone Follow-up for Positive STD Test    At the time of Dia Webb visit to [facility] Emergency Department on 7/1/19 STD testing was performed. DNA testing was positive for Chlamydia      Spoke to patient at 10:22 am on 7/3/19 regarding need to start oral antibiotic therapy. .   Instructed patient to abstain from sexual intercourse until receiving the antibiotic and then for 7 days after treatment. Patient also advised to inform any partners that they will need to be tested as well. Patient verbally expressed understanding of above plan. Per protocol, Zithromax 500 mg X2 tablets called into [address] on behalf of Dr. Loren Casillas.       Electronically signed by Mayco Guardado on 7/3/2019 at 10:24 AM
101/fahrenheit

## 2021-11-20 ENCOUNTER — HOSPITAL ENCOUNTER (EMERGENCY)
Age: 26
Discharge: HOME | End: 2021-11-20
Attending: EMERGENCY MEDICINE
Payer: MEDICARE

## 2021-11-20 VITALS
DIASTOLIC BLOOD PRESSURE: 83 MMHG | TEMPERATURE: 99.3 F | WEIGHT: 250 LBS | HEART RATE: 95 BPM | SYSTOLIC BLOOD PRESSURE: 120 MMHG | HEIGHT: 61 IN | RESPIRATION RATE: 16 BRPM | OXYGEN SATURATION: 99 % | BODY MASS INDEX: 47.2 KG/M2

## 2021-11-20 DIAGNOSIS — L73.2 HIDRADENITIS SUPPURATIVA: Primary | ICD-10-CM

## 2021-11-20 PROCEDURE — 99283 EMERGENCY DEPT VISIT LOW MDM: CPT

## 2021-11-20 RX ORDER — ACETAMINOPHEN AND CODEINE PHOSPHATE 300; 30 MG/1; MG/1
1 TABLET ORAL EVERY 6 HOURS PRN
Qty: 12 TABLET | Refills: 0 | Status: SHIPPED | OUTPATIENT
Start: 2021-11-20 | End: 2021-11-23

## 2021-11-20 RX ORDER — SULFAMETHOXAZOLE AND TRIMETHOPRIM 800; 160 MG/1; MG/1
1 TABLET ORAL 2 TIMES DAILY
Qty: 20 TABLET | Refills: 0 | Status: SHIPPED | OUTPATIENT
Start: 2021-11-20 | End: 2021-11-30

## 2021-11-20 RX ORDER — CEPHALEXIN 500 MG/1
500 CAPSULE ORAL 4 TIMES DAILY
Qty: 40 CAPSULE | Refills: 0 | Status: SHIPPED | OUTPATIENT
Start: 2021-11-20 | End: 2021-11-30

## 2021-11-20 RX ORDER — AMITRIPTYLINE HYDROCHLORIDE 10 MG/1
10 TABLET, FILM COATED ORAL NIGHTLY
COMMUNITY

## 2021-11-20 ASSESSMENT — PAIN DESCRIPTION - ORIENTATION: ORIENTATION: LEFT

## 2021-11-20 ASSESSMENT — PAIN DESCRIPTION - FREQUENCY: FREQUENCY: CONTINUOUS

## 2021-11-20 ASSESSMENT — ENCOUNTER SYMPTOMS: COLOR CHANGE: 1

## 2021-11-20 ASSESSMENT — PAIN SCALES - GENERAL: PAINLEVEL_OUTOF10: 10

## 2021-11-20 ASSESSMENT — PAIN DESCRIPTION - LOCATION: LOCATION: ARM

## 2021-11-20 NOTE — ED PROVIDER NOTES
[address] [place] [address] ED  EMERGENCY DEPARTMENT ENCOUNTER      Pt Name: Amber Boone  MRN: [id number]  [place] 1995  Date of evaluation: 11/20/2021  Provider: DEON Preston CNP    CHIEF COMPLAINT       Chief Complaint   Patient presents with    Abscess     left AX         HISTORY OFPRESENT ILLNESS  (Location/Symptom, Timing/Onset, Context/Setting, Quality, Duration, Modifying Factors, Severity.)   Amber Boone is a 32 y.o. female who presents to the emergency department by private auto for evaluation of worsening left axilla abscess for the past several days. Patient has history of hidradenitis. Pain is a 9-10. Denies fever or chills. Patient states her left side of her neck and shoulder sore from holding her arm a certain way. Nursing Notes were reviewed. PASTMEDICAL HISTORY     Past Medical History:   Diagnosis Date    Asthma     Headache(784.0)     Pseudotumor          SURGICAL HISTORY       Past Surgical History:   Procedure Laterality Date    TONSILLECTOMY           CURRENT MEDICATIONS     Discharge Medication List as of 11/20/2021  1:47 PM      CONTINUE these medications which have NOT CHANGED    Details   amitriptyline (ELAVIL) 10 MG tablet Take 10 mg by mouth nightlyHistorical Med      naproxen (NAPROSYN) 500 MG tablet take 1 tablet by mouth twice a day if needed for painHistorical Med      ondansetron (ZOFRAN ODT) 4 MG disintegrating tablet Take 1 tablet by mouth every 8 hours as needed for Nausea, Disp-20 tablet, R-0Print      albuterol sulfate HFA (VENTOLIN HFA) 108 (90 Base) MCG/ACT inhaler Inhale 2 puffs into the lungs 4 times daily as needed for Wheezing, Disp-1 Inhaler, R-2Normal             ALLERGIES     Patient has no known allergies. FAMILY HISTORY     History reviewed. No pertinent family history.        SOCIAL HISTORY       Social History     Socioeconomic History    Marital status: Single     Spouse name: None    Number of children: None    Years of education: None ULTRASOUND:   Performed by Jeff Fajardo - none    LABS:  Labs Reviewed - No data to display    All other labs were within normal range or not returned as of this dictation. EMERGENCY DEPARTMENT COURSE andDIFFERENTIAL DIAGNOSIS/MDM:   Examination shows left axilla hidradenitis. Patient has had previous surgery for this issue before. She will be referred to general surgeon. Prescriptions are written for Keflex, Bactrim, Tylenol with codeine, OARRS reviewed. Return precautions provided. Vitals:    Vitals:    11/20/21 1241   BP: 120/83   Pulse: 95   Resp: 16   Temp: 99.3 °F (37.4 °C)   TempSrc: Oral   SpO2: 99%   Weight: 250 lb (113.4 kg)   Height: 5' 1\" (1.549 m)         CONSULTS:  None    RES:  Procedures    FINAL IMPRESSION      1. Hidradenitis suppurativa          DISPOSITION/PLAN   DISPOSITION Decision To Discharge 11/20/2021 01:43:03 PM      PATIENT REFERRED TO:   Chalo Rodriguez MD  [address] [place] [address]  [phone]    Schedule an appointment as soon as possible for a visit       [facility]  [address] [facility]  [phone]    If symptoms worsen      DISCHARGE MEDICATIONS:     Discharge Medication List as of 11/20/2021  1:47 PM      START taking these medications    Details   sulfamethoxazole-trimethoprim (BACTRIM DS) 800-160 MG per tablet Take 1 tablet by mouth 2 times daily for 10 days, Disp-20 tablet, R-0Print      cephALEXin (KEFLEX) 500 MG capsule Take 1 capsule by mouth 4 times daily for 10 days, Disp-40 capsule, R-0Print      acetaminophen-codeine (TYLENOL/CODEINE #3) 300-30 MG per tablet Take 1 tablet by mouth every 6 hours as needed for Pain for up to 3 days. , Disp-12 tablet, R-0Print           Electronically signed by DEON Johnston 11/20/2021 at 6:52 PM           DEON Johnston CNP  11/20/21 9770

## 2021-11-20 NOTE — ED PROVIDER NOTES
eMERGENCY dEPARTMENT eNCOUnter   Independent Attestation     Pt Name: Nicole Colunga  MRN: [id number]  [place] 1995  Date of evaluation: 11/20/21     Nicole Colunga is a 32 y.o. female with CC: Abscess (left AX)      Based on the medical record the care appears appropriate. I was personally available for consultation in the Emergency Department. The care is provided during an unprecedented national emergency due to the novel coronavirus, COVID 19.     Mercedes Rogers MD  Attending Emergency Physician                    Mercedes Rogers MD  30/42/51 1420

## 2022-06-02 ENCOUNTER — HOSPITAL ENCOUNTER (EMERGENCY)
Age: 27
Discharge: HOME | End: 2022-06-03
Attending: STUDENT IN AN ORGANIZED HEALTH CARE EDUCATION/TRAINING PROGRAM
Payer: MEDICARE

## 2022-06-02 VITALS
SYSTOLIC BLOOD PRESSURE: 162 MMHG | WEIGHT: 259.7 LBS | RESPIRATION RATE: 20 BRPM | TEMPERATURE: 99 F | BODY MASS INDEX: 49.07 KG/M2 | DIASTOLIC BLOOD PRESSURE: 102 MMHG | OXYGEN SATURATION: 99 % | HEART RATE: 91 BPM

## 2022-06-02 DIAGNOSIS — R10.13 ABDOMINAL PAIN, EPIGASTRIC: Primary | ICD-10-CM

## 2022-06-02 PROCEDURE — 85025 COMPLETE CBC W/AUTO DIFF WBC: CPT

## 2022-06-02 PROCEDURE — 99283 EMERGENCY DEPT VISIT LOW MDM: CPT

## 2022-06-02 RX ORDER — MAGNESIUM HYDROXIDE/ALUMINUM HYDROXICE/SIMETHICONE 120; 1200; 1200 MG/30ML; MG/30ML; MG/30ML
30 SUSPENSION ORAL ONCE
Status: COMPLETED | OUTPATIENT
Start: 2022-06-02 | End: 2022-06-03

## 2022-06-02 RX ORDER — ONDANSETRON 2 MG/ML
4 INJECTION INTRAMUSCULAR; INTRAVENOUS ONCE
Status: DISCONTINUED | OUTPATIENT
Start: 2022-06-02 | End: 2022-06-02

## 2022-06-02 RX ORDER — ONDANSETRON 4 MG/1
4 TABLET, ORALLY DISINTEGRATING ORAL ONCE
Status: COMPLETED | OUTPATIENT
Start: 2022-06-02 | End: 2022-06-03

## 2022-06-02 ASSESSMENT — PAIN SCALES - GENERAL: PAINLEVEL_OUTOF10: 10

## 2022-06-02 ASSESSMENT — PAIN - FUNCTIONAL ASSESSMENT: PAIN_FUNCTIONAL_ASSESSMENT: 0-10

## 2022-06-03 LAB
ABSOLUTE EOS #: 0.4 K/UL (ref 0–0.44)
ABSOLUTE IMMATURE GRANULOCYTE: 0.05 K/UL (ref 0–0.3)
ABSOLUTE LYMPH #: 3.64 K/UL (ref 1.1–3.7)
ABSOLUTE MONO #: 0.74 K/UL (ref 0.1–1.2)
ALBUMIN SERPL-MCNC: 4.1 G/DL (ref 3.5–5.2)
ALP BLD-CCNC: 65 U/L (ref 35–104)
ALT SERPL-CCNC: 11 U/L (ref 5–33)
ANION GAP SERPL CALCULATED.3IONS-SCNC: 12 MMOL/L (ref 9–17)
AST SERPL-CCNC: 13 U/L
BASOPHILS # BLD: 0 % (ref 0–2)
BASOPHILS ABSOLUTE: 0.05 K/UL (ref 0–0.2)
BILIRUB SERPL-MCNC: 0.2 MG/DL (ref 0.3–1.2)
BUN BLDV-MCNC: 14 MG/DL (ref 6–20)
BUN/CREAT BLD: 13 (ref 9–20)
CALCIUM SERPL-MCNC: 9.2 MG/DL (ref 8.6–10.4)
CHLORIDE BLD-SCNC: 107 MMOL/L (ref 98–107)
CHP ED QC CHECK: YES
CO2: 22 MMOL/L (ref 20–31)
CREAT SERPL-MCNC: 1.07 MG/DL (ref 0.5–0.9)
EOSINOPHILS RELATIVE PERCENT: 3 % (ref 1–4)
GFR AFRICAN AMERICAN: >60 ML/MIN
GFR NON-AFRICAN AMERICAN: >60 ML/MIN
GFR SERPL CREATININE-BSD FRML MDRD: ABNORMAL ML/MIN/{1.73_M2}
GLUCOSE BLD-MCNC: 87 MG/DL (ref 70–99)
HCT VFR BLD CALC: 39 % (ref 36.3–47.1)
HEMOGLOBIN: 11.9 G/DL (ref 11.9–15.1)
IMMATURE GRANULOCYTES: 0 %
LIPASE: 16 U/L (ref 13–60)
LYMPHOCYTES # BLD: 28 % (ref 24–43)
MCH RBC QN AUTO: 26.6 PG (ref 25.2–33.5)
MCHC RBC AUTO-ENTMCNC: 30.5 G/DL (ref 28.4–34.8)
MCV RBC AUTO: 87.1 FL (ref 82.6–102.9)
MONOCYTES # BLD: 6 % (ref 3–12)
NRBC AUTOMATED: 0 PER 100 WBC
PDW BLD-RTO: 14.7 % (ref 11.8–14.4)
PLATELET # BLD: 267 K/UL (ref 138–453)
PMV BLD AUTO: 11.5 FL (ref 8.1–13.5)
POTASSIUM SERPL-SCNC: 4.9 MMOL/L (ref 3.7–5.3)
PREGNANCY TEST URINE, POC: NEGATIVE
RBC # BLD: 4.48 M/UL (ref 3.95–5.11)
RBC # BLD: ABNORMAL 10*6/UL
REASON FOR REJECTION: NORMAL
SEG NEUTROPHILS: 63 % (ref 36–65)
SEGMENTED NEUTROPHILS ABSOLUTE COUNT: 8.36 K/UL (ref 1.5–8.1)
SODIUM BLD-SCNC: 141 MMOL/L (ref 135–144)
TOTAL PROTEIN: 7.2 G/DL (ref 6.4–8.3)
WBC # BLD: 13.2 K/UL (ref 3.5–11.3)
ZZ NTE CLEAN UP: ORDERED TEST: NORMAL
ZZ NTE WITH NAME CLEAN UP: SPECIMEN SOURCE: NORMAL

## 2022-06-03 PROCEDURE — 81025 URINE PREGNANCY TEST: CPT

## 2022-06-03 PROCEDURE — 83690 ASSAY OF LIPASE: CPT

## 2022-06-03 PROCEDURE — 6370000000 HC RX 637 (ALT 250 FOR IP): Performed by: STUDENT IN AN ORGANIZED HEALTH CARE EDUCATION/TRAINING PROGRAM

## 2022-06-03 PROCEDURE — 80053 COMPREHEN METABOLIC PANEL: CPT

## 2022-06-03 RX ORDER — ONDANSETRON 4 MG/1
4 TABLET, ORALLY DISINTEGRATING ORAL EVERY 8 HOURS PRN
Qty: 12 TABLET | Refills: 0 | Status: SHIPPED | OUTPATIENT
Start: 2022-06-03

## 2022-06-03 RX ORDER — PANTOPRAZOLE SODIUM 20 MG/1
40 TABLET, DELAYED RELEASE ORAL DAILY
Qty: 30 TABLET | Refills: 5 | Status: SHIPPED | OUTPATIENT
Start: 2022-06-03

## 2022-06-03 RX ADMIN — ALUMINA, MAGNESIA, AND SIMETHICONE ORAL SUSPENSION REGULAR STRENGTH 30 ML: 1200; 1200; 120 SUSPENSION ORAL at 00:40

## 2022-06-03 RX ADMIN — ONDANSETRON 4 MG: 4 TABLET, ORALLY DISINTEGRATING ORAL at 00:40

## 2022-06-03 ASSESSMENT — ENCOUNTER SYMPTOMS
BACK PAIN: 0
SHORTNESS OF BREATH: 0
EYE REDNESS: 0
NAUSEA: 1
ABDOMINAL PAIN: 1
EYE DISCHARGE: 0

## 2022-06-03 NOTE — ED PROVIDER NOTES
[facility]  Emergency Department Encounter     Pt Name: Júnior Vidal  MRN: [id number]  [place] 1995  Date of evaluation: 6/3/22  PCP:  DEON Burciaga    CHIEF COMPLAINT       Chief Complaint   Patient presents with    Abdominal Pain     x1 month, loose stools denies GI hx    Headache       HISTORY OFPRESENT ILLNESS  (Location/Symptom, Timing/Onset, Context/Setting, Quality, Duration, Modifying [place].)      Júnior Vidal is a 32 y.o. female who presents with epigastric abdominal pain ongoing for the past month. Intermittent. Epigastric. Worse after eating. Burning. States she is also been diagnosed with pseudotumor and has had intermittent headaches for the past many years due to this. This is a chronic complaint and her main complaint is abdominal pain. Has not seen GI physician. Denying previous abdominal surgeries. Pain is moderate to severe. Worsening. PAST MEDICAL / SURGICAL / SOCIAL / FAMILY HISTORY      has a past medical history of Asthma, Headache(784.0), and Pseudotumor. has a past surgical history that includes Tonsillectomy.     Social History     Socioeconomic History    Marital status: Single     Spouse name: Not on file    Number of children: Not on file    Years of education: Not on file    Highest education level: Not on file   Occupational History    Not on file   Tobacco Use    Smoking status: Former Smoker     Types: Cigars    Smokeless tobacco: Never Used    Tobacco comment: 1 black-n-mild cigar per day   Vaping Use    Vaping Use: Never used   Substance and Sexual Activity    Alcohol use: No    Drug use: No    Sexual activity: Not on file   Other Topics Concern    Not on file   Social History Narrative    Not on file     Social Determinants of Health     Financial Resource Strain:     Difficulty of Paying Living Expenses: Not on file   Food Insecurity:     Worried About [address] in the Last Year: Not on file    [address] in the Last Year: Not on file   Transportation Needs:     Lack of Transportation (Medical): Not on file    Lack of Transportation (Non-Medical): Not on file   Physical Activity:     Days of Exercise per Week: Not on file    Minutes of Exercise per Session: Not on file   Stress:     Feeling of Stress : Not on file   Social Connections:     Frequency of Communication with Friends and Family: Not on file    Frequency of Social Gatherings with Friends and Family: Not on file    Attends [employer] Services: Not on file    Active Member of [address] [place] [address] [employer] or Organizations: Not on file    Attends Club or Organization Meetings: Not on file    Marital Status: Not on file   Intimate Partner Violence:     Fear of Current or Ex-Partner: Not on file    Emotionally Abused: Not on file    Physically Abused: Not on file    Sexually Abused: Not on file   Housing Stability:     Unable to Pay for Housing in the Last Year: Not on file    Number of [place] in the Last Year: Not on file    Unstable Housing in the Last Year: Not on file       No family history on file. Allergies:  Patient has no known allergies. Home Medications:  Prior to Admission medications    Medication Sig Start Date End Date Taking?  Authorizing Provider   ondansetron (ZOFRAN ODT) 4 MG disintegrating tablet Take 1 tablet by mouth every 8 hours as needed for Nausea 6/3/22  Yes Sherryle Creamer, DO   pantoprazole (PROTONIX) 20 MG tablet Take 2 tablets by mouth daily 6/3/22  Yes Sherryle Creamer, DO   aluminum-magnesium hydroxide 200-200 MG/5ML suspension Take 10 mLs by mouth every 6 hours as needed for Indigestion 6/3/22  Yes Sherryle Creamer, DO   amitriptyline (ELAVIL) 10 MG tablet Take 10 mg by mouth nightly    Historical Provider, MD   albuterol sulfate HFA (VENTOLIN HFA) 108 (90 Base) MCG/ACT inhaler Inhale 2 puffs into the lungs 4 times daily as needed for Wheezing 8/14/21   Yoana Nguyễn MD   naproxen (NAPROSYN) 500 MG tablet take 1 tablet by mouth twice a day if needed for pain 7/22/21 6/3/22  Historical Provider, MD       REVIEW OF SYSTEMS    (2-9 systems for level 4, 10 or more for level 5)      Review of Systems   Constitutional: Negative for chills and fever. Eyes: Negative for discharge and redness. Respiratory: Negative for shortness of breath. Cardiovascular: Negative for chest pain. Gastrointestinal: Positive for abdominal pain and nausea. Genitourinary: Negative for flank pain. Musculoskeletal: Negative for back pain. Skin: Negative for rash. Neurological: Negative for headaches. Psychiatric/Behavioral: Negative for agitation and confusion. PHYSICAL EXAM   (up to 7 for level 4, 8 or more for level 5)     INITIAL VITALS:    weight is 259 lb 11.2 oz (117.8 kg). Her oral temperature is 99 °F (37.2 °C). Her blood pressure is 162/102 (abnormal) and her pulse is 91. Her respiration is 20 and oxygen saturation is 99%. Physical Exam  Vitals and nursing note reviewed. Constitutional:       Appearance: She is well-developed. HENT:      Head: Normocephalic and atraumatic. Nose: Nose normal.      Mouth/Throat:      Mouth: Mucous membranes are moist.   Eyes:      General: No scleral icterus. Conjunctiva/sclera: Conjunctivae normal.      Pupils: Pupils are equal, round, and reactive to light. Neck:      Trachea: No tracheal deviation. Cardiovascular:      Rate and Rhythm: Normal rate and regular rhythm. Heart sounds: Normal heart sounds. No murmur heard. No friction rub. No gallop. Pulmonary:      Effort: Pulmonary effort is normal. No respiratory distress. Breath sounds: Normal breath sounds. No wheezing or rales. Abdominal:      General: There is no distension. Palpations: Abdomen is soft. There is no mass. Tenderness: There is abdominal tenderness. There is no guarding. Hernia: No hernia is present.       Comments: Mild tenderness to epigastric abdominal palpation. No right lower quadrant tenderness, no right upper quadrant tenderness. No guarding or mass no rebound   Musculoskeletal:         General: Normal range of motion. Cervical back: Neck supple. Skin:     General: Skin is warm and dry. Findings: No erythema or rash. Neurological:      Mental Status: She is alert and oriented to person, place, and time. Psychiatric:         Behavior: Behavior normal.         DIFFERENTIAL  DIAGNOSIS     PLAN (LABS / IMAGING / EKG):  Orders Placed This Encounter   Procedures    CBC with Auto Differential    HCG Qualitative, Serum    SPECIMEN REJECTION    Comprehensive Metabolic Panel    Lipase    POCT urine pregnancy       MEDICATIONS ORDERED:  Orders Placed This Encounter   Medications    DISCONTD: famotidine (PEPCID) 20 mg in sodium chloride (PF) 10 mL injection    aluminum & magnesium hydroxide-simethicone (MAALOX) 200-200-20 MG/5ML suspension 30 mL    DISCONTD: ondansetron (ZOFRAN) injection 4 mg    ondansetron (ZOFRAN-ODT) disintegrating tablet 4 mg    ondansetron (ZOFRAN ODT) 4 MG disintegrating tablet     Sig: Take 1 tablet by mouth every 8 hours as needed for Nausea     Dispense:  12 tablet     Refill:  0    pantoprazole (PROTONIX) 20 MG tablet     Sig: Take 2 tablets by mouth daily     Dispense:  30 tablet     Refill:  5    aluminum-magnesium hydroxide 200-200 MG/5ML suspension     Sig: Take 10 mLs by mouth every 6 hours as needed for Indigestion     Dispense:  710 mL     Refill:  ml       DDX: Pancreatitis versus cholecystitis versus pregnancy versus GERD    Initial MDM/Plan: 32 y.o. female who presents with epigastric pain. Symptoms seem consistent with GERD however discussed with patient cholelithiasis always possible. Will get basic labs Maalox Zofran. Reassess.     DIAGNOSTIC RESULTS / EMERGENCY DEPARTMENT COURSE / MDM     LABS:  Labs Reviewed   CBC WITH AUTO DIFFERENTIAL - Abnormal; Notable for the following components: Result Value    WBC 13.2 (*)     RDW 14.7 (*)     Segs Absolute 8.36 (*)     All other components within normal limits   COMPREHENSIVE METABOLIC PANEL - Abnormal; Notable for the following components:    CREATININE 1.07 (*)     Total Bilirubin 0.20 (*)     All other components within normal limits   POCT URINE PREGNANCY - Normal   SPECIMEN REJECTION   LIPASE   HCG, SERUM, QUALITATIVE         RADIOLOGY:  No results found. EMERGENCY DEPARTMENT COURSE:  Labs largely unremarkable. Mildly elevated white count. No other signs of infection. No fever. Normal lipase. Pregnancy negative. Did have improvement with Maalox. Suspect GERD. Follow-up PCP. Started on PPI. Return for worsening signs or symptoms. · Based on the low acuity of concerning symptoms and improvement of symptoms, patient will be discharged with follow up and prescription information listed in the Disposition section. · Patient states they will follow-up with primary care physician and/or return to the emergency department should they experience a change or worsening of symptoms. · Patient will be discharged with resources: summary of visit, instructions, follow-up information, prescriptions if necessary. · Patient/ family instructed to read discharge paperwork. All of their questions and concerns were addressed. · Suspicion for any acute life-threatening processes is low. Patient voices understanding of plan. PROCEDURES:  None    CONSULTS:  None    CRITICAL CARE:  0    FINAL IMPRESSION      1.  Abdominal pain, epigastric          DISPOSITION / PLAN     DISPOSITION Decision To Discharge 06/03/2022 02:42:57 AM    Discharge    PATIENTREFERRED TO:  DEON Beard  [address] [facility]  [phone]    Schedule an appointment as soon as possible for a visit in 1 week        DISCHARGE MEDICATIONS:  Discharge Medication List as of 6/3/2022  2:44 AM      START taking these medications    Details   pantoprazole (PROTONIX) 20 MG tablet Take 2 tablets by mouth daily, Disp-30 tablet, R-5Print      aluminum-magnesium hydroxide 200-200 MG/5ML suspension Take 10 mLs by mouth every 6 hours as needed for Indigestion, Disp-710 mL, R-mlPrint             Luis A Brito DO  EmergencyMedicine Attending    (Please note that portions of this note were completed with a voice recognition program.  Efforts were made to edit the dictations but occasionally words are mis-transcribed.)       Luis A Brito DO  06/03/22 9129

## 2022-06-03 NOTE — ED NOTES
Pt presents to the er c/o a headache.  Pt also states that she has upper abdominal pain and loose stools for one month     Margaret Rosales, [place]  06/03/22 9893

## 2022-06-06 LAB — HCG, PREGNANCY URINE (POC): NEGATIVE

## 2022-11-02 ENCOUNTER — HOSPITAL ENCOUNTER (EMERGENCY)
Age: 27
Discharge: HOME | End: 2022-11-03
Attending: EMERGENCY MEDICINE
Payer: MEDICARE

## 2022-11-02 ENCOUNTER — APPOINTMENT (OUTPATIENT)
Dept: CT IMAGING | Age: 27
End: 2022-11-02
Payer: MEDICARE

## 2022-11-02 VITALS
RESPIRATION RATE: 16 BRPM | HEART RATE: 102 BPM | DIASTOLIC BLOOD PRESSURE: 95 MMHG | BODY MASS INDEX: 49.09 KG/M2 | OXYGEN SATURATION: 100 % | TEMPERATURE: 98.4 F | WEIGHT: 260 LBS | HEIGHT: 61 IN | SYSTOLIC BLOOD PRESSURE: 139 MMHG

## 2022-11-02 DIAGNOSIS — M54.2 NECK PAIN: ICD-10-CM

## 2022-11-02 DIAGNOSIS — R51.9 PRESSURE IN HEAD: Primary | ICD-10-CM

## 2022-11-02 LAB
ABSOLUTE EOS #: 0.34 K/UL (ref 0–0.44)
ABSOLUTE IMMATURE GRANULOCYTE: 0.03 K/UL (ref 0–0.3)
ABSOLUTE LYMPH #: 4.65 K/UL (ref 1.1–3.7)
ABSOLUTE MONO #: 0.81 K/UL (ref 0.1–1.2)
ANION GAP SERPL CALCULATED.3IONS-SCNC: 11 MMOL/L (ref 9–17)
BASOPHILS # BLD: 0 % (ref 0–2)
BASOPHILS ABSOLUTE: 0.04 K/UL (ref 0–0.2)
BUN BLDV-MCNC: 13 MG/DL (ref 6–20)
CALCIUM SERPL-MCNC: 9.3 MG/DL (ref 8.6–10.4)
CHLORIDE BLD-SCNC: 104 MMOL/L (ref 98–107)
CO2: 22 MMOL/L (ref 20–31)
CREAT SERPL-MCNC: 0.89 MG/DL (ref 0.5–0.9)
EOSINOPHILS RELATIVE PERCENT: 2 % (ref 1–4)
GFR SERPL CREATININE-BSD FRML MDRD: >60 ML/MIN/1.73M2
GLUCOSE BLD-MCNC: 94 MG/DL (ref 70–99)
HCG QUALITATIVE: NEGATIVE
HCT VFR BLD CALC: 33.3 % (ref 36.3–47.1)
HEMOGLOBIN: 10.7 G/DL (ref 11.9–15.1)
IMMATURE GRANULOCYTES: 0 %
LYMPHOCYTES # BLD: 32 % (ref 24–43)
MCH RBC QN AUTO: 27.6 PG (ref 25.2–33.5)
MCHC RBC AUTO-ENTMCNC: 32.1 G/DL (ref 28.4–34.8)
MCV RBC AUTO: 85.8 FL (ref 82.6–102.9)
MONOCYTES # BLD: 6 % (ref 3–12)
NRBC AUTOMATED: 0 PER 100 WBC
PDW BLD-RTO: 13.6 % (ref 11.8–14.4)
PLATELET # BLD: 285 K/UL (ref 138–453)
PMV BLD AUTO: 11.5 FL (ref 8.1–13.5)
POTASSIUM SERPL-SCNC: 4.3 MMOL/L (ref 3.7–5.3)
RBC # BLD: 3.88 M/UL (ref 3.95–5.11)
SEG NEUTROPHILS: 60 % (ref 36–65)
SEGMENTED NEUTROPHILS ABSOLUTE COUNT: 8.56 K/UL (ref 1.5–8.1)
SODIUM BLD-SCNC: 137 MMOL/L (ref 135–144)
WBC # BLD: 14.4 K/UL (ref 3.5–11.3)

## 2022-11-02 PROCEDURE — 85025 COMPLETE CBC W/AUTO DIFF WBC: CPT

## 2022-11-02 PROCEDURE — 96374 THER/PROPH/DIAG INJ IV PUSH: CPT

## 2022-11-02 PROCEDURE — 99284 EMERGENCY DEPT VISIT MOD MDM: CPT

## 2022-11-02 PROCEDURE — 70450 CT HEAD/BRAIN W/O DYE: CPT

## 2022-11-02 PROCEDURE — 84703 CHORIONIC GONADOTROPIN ASSAY: CPT

## 2022-11-02 PROCEDURE — 80048 BASIC METABOLIC PNL TOTAL CA: CPT

## 2022-11-02 PROCEDURE — 6370000000 HC RX 637 (ALT 250 FOR IP): Performed by: STUDENT IN AN ORGANIZED HEALTH CARE EDUCATION/TRAINING PROGRAM

## 2022-11-02 PROCEDURE — 6360000002 HC RX W HCPCS: Performed by: STUDENT IN AN ORGANIZED HEALTH CARE EDUCATION/TRAINING PROGRAM

## 2022-11-02 RX ORDER — ACETAMINOPHEN 500 MG
1000 TABLET ORAL ONCE
Status: COMPLETED | OUTPATIENT
Start: 2022-11-02 | End: 2022-11-02

## 2022-11-02 RX ORDER — KETOROLAC TROMETHAMINE 30 MG/ML
30 INJECTION, SOLUTION INTRAMUSCULAR; INTRAVENOUS ONCE
Status: COMPLETED | OUTPATIENT
Start: 2022-11-02 | End: 2022-11-02

## 2022-11-02 RX ADMIN — KETOROLAC TROMETHAMINE 30 MG: 30 INJECTION, SOLUTION INTRAMUSCULAR; INTRAVENOUS at 23:13

## 2022-11-02 RX ADMIN — ACETAMINOPHEN 1000 MG: 500 TABLET ORAL at 21:48

## 2022-11-02 ASSESSMENT — PAIN SCALES - GENERAL
PAINLEVEL_OUTOF10: 6
PAINLEVEL_OUTOF10: 6

## 2022-11-02 ASSESSMENT — PAIN - FUNCTIONAL ASSESSMENT
PAIN_FUNCTIONAL_ASSESSMENT: ACTIVITIES ARE NOT PREVENTED
PAIN_FUNCTIONAL_ASSESSMENT: 0-10

## 2022-11-02 ASSESSMENT — PAIN DESCRIPTION - PAIN TYPE: TYPE: ACUTE PAIN

## 2022-11-02 ASSESSMENT — PAIN DESCRIPTION - LOCATION
LOCATION: HEAD
LOCATION: HEAD

## 2022-11-02 ASSESSMENT — PAIN DESCRIPTION - DESCRIPTORS
DESCRIPTORS: PRESSURE
DESCRIPTORS: PRESSURE

## 2022-11-02 ASSESSMENT — PAIN DESCRIPTION - FREQUENCY: FREQUENCY: INTERMITTENT

## 2022-11-02 NOTE — ED TRIAGE NOTES
Pt here for headache and dizziness and nausea  That began 1 week ago, and reports issues with sleep  Pt describes the headaches as a pressure, and 6/10   She reports she can hear her blood flow, and some nausea   Reports white spots, did see an eye doctor yesterday

## 2022-11-03 RX ORDER — CYCLOBENZAPRINE HCL 10 MG
10 TABLET ORAL 2 TIMES DAILY PRN
Qty: 12 TABLET | Refills: 0 | Status: SHIPPED | OUTPATIENT
Start: 2022-11-03

## 2022-11-03 ASSESSMENT — ENCOUNTER SYMPTOMS
BACK PAIN: 0
EYE PAIN: 0
COUGH: 0
NAUSEA: 0
DIARRHEA: 0
RHINORRHEA: 0
SHORTNESS OF BREATH: 0
EYE REDNESS: 0
VOMITING: 0
ABDOMINAL PAIN: 0

## 2022-11-03 NOTE — ED PROVIDER NOTES
[facility]  ED  Emergency Department Encounter  Emergency Medicine Resident     Pt Name: Jose G Romero  MRN: [id number]  [place] 1995  Date of evaluation: 11/2/22  PCP:  DEON Fry    CHIEF COMPLAINT       Chief Complaint   Patient presents with    Headache     Trouble sleeping     Neck Pain    Dizziness       HISTORY OFPRESENT ILLNESS  (Location/Symptom, Timing/Onset, Context/Setting, Quality, Duration, Modifying [place].)      Jose G Romero is a 32 y.o. female who presents with headache, neck pain and lightheadedness. Patient states she has a possible history of pseudotumor cerebri. She never had an LP done. She is followed with neurology and ophthalmology. She was on amitriptyline for headaches and for sleeping but started having headache again last week. Sleep is significantly decreased due to the headache. Initial headache was sudden onset, severe. She has intermittent head pressure in between headache episodes. She also has right-sided neck pain without associated injury. No back pain, fevers, chills. No cough or cold symptoms. She does feel lightheaded and describes white spots in her eyes when the headache is bad. She saw her ophthalmologist yesterday. There is no optic disc edema noted during that visit. 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 This has been an ongoing problem. PAST MEDICAL / SURGICAL / SOCIAL / FAMILY HISTORY      has a past medical history of Asthma, Headache(784.0), and Pseudotumor. has a past surgical history that includes Tonsillectomy.      Social History     Socioeconomic History    Marital status: Single     Spouse name: Not on file    Number of children: Not on file    Years of education: Not on file    Highest education level: Not on file   Occupational History    Not on file   Tobacco Use    Smoking status: Former     Types: Cigars    Smokeless tobacco: Never    Tobacco comments:     1 black-n-mild cigar per day   Vaping Use    Vaping Use: Never used   Substance and Sexual Activity    Alcohol use: No    Drug use: No    Sexual activity: Not on file   Other Topics Concern    Not on file   Social History Narrative    Not on file     Social Determinants of Health     Financial Resource Strain: Not on file   Food Insecurity: Not on file   Transportation Needs: Not on file   Physical Activity: Not on file   Stress: Not on file   Social Connections: Not on file   Intimate Partner Violence: Not on file   Housing Stability: Not on file       History reviewed. No pertinent family history. Allergies:  Patient has no known allergies. Home Medications:  Prior to Admission medications    Medication Sig Start Date End Date Taking? Authorizing Provider   cyclobenzaprine (FLEXERIL) 10 MG tablet Take 1 tablet by mouth 2 times daily as needed for Muscle spasms 11/3/22  Yes Tyler Lopez, DO   ondansetron (ZOFRAN ODT) 4 MG disintegrating tablet Take 1 tablet by mouth every 8 hours as needed for Nausea 6/3/22   Debbie Unger, DO   pantoprazole (PROTONIX) 20 MG tablet Take 2 tablets by mouth daily 6/3/22   Debbie Unger, DO   aluminum-magnesium hydroxide 200-200 MG/5ML suspension Take 10 mLs by mouth every 6 hours as needed for Indigestion 6/3/22   Debbie Unger, DO   amitriptyline (ELAVIL) 10 MG tablet Take 10 mg by mouth nightly    Historical Provider, MD   albuterol sulfate HFA (VENTOLIN HFA) 108 (90 Base) MCG/ACT inhaler Inhale 2 puffs into the lungs 4 times daily as needed for Wheezing 8/14/21   Kevin Umanzor MD   naproxen (NAPROSYN) 500 MG tablet take 1 tablet by mouth twice a day if needed for pain 7/22/21 6/3/22  Historical Provider, MD       REVIEW OFSYSTEMS    (2-9 systems for level 4, 10 or more for level 5)      Review of Systems   Constitutional:  Negative for chills and fever. HENT:  Negative for congestion and rhinorrhea. Eyes:  Positive for visual disturbance. Negative for pain and redness. No edema. Skin:     General: Skin is warm and dry. Neurological:      General: No focal deficit present. Mental Status: She is alert and oriented to person, place, and time. Cranial Nerves: No cranial nerve deficit. Sensory: No sensory deficit. Motor: No weakness. Psychiatric:         Mood and Affect: Mood normal.       DIFFERENTIAL  DIAGNOSIS     PLAN (LABS / IMAGING / EKG):  Orders Placed This Encounter   Procedures    CT HEAD WO CONTRAST    CBC with Auto Differential    BMP    HCG Qualitative, Serum       MEDICATIONS ORDERED:  Orders Placed This Encounter   Medications    acetaminophen (TYLENOL) tablet 1,000 mg    ketorolac (TORADOL) injection 30 mg    cyclobenzaprine (FLEXERIL) 10 MG tablet     Sig: Take 1 tablet by mouth 2 times daily as needed for Muscle spasms     Dispense:  12 tablet     Refill:  0       DDX: Pseudotumor cerebri, migraine headache, tension headache, viral illness    Initial MDM/Plan/ED COURSE:    32 y.o. female who presents with headache, lightheadedness, visual changes, and neck pain. Patient appears well on exam, vitals are stable. Heart rate is slightly elevated at 102. Neuro exam intact, no deficits with exception of delayed lateral movement of the left eye. Consistent with previously noted cranial nerve VI palsy. She does have some muscular tenderness in the right upper trap and neck. No signs of trauma, no bony tenderness. No neck stiffness. Low concern for meningitis. Patient does not have official diagnosis of pseudotumor cerebri as she has never undergone an LP but had empty sella on previous CT. We will repeat CT as this was over 1 year ago. Given lightheadedness, will also check labs to evaluate for electrolyte abnormality. Anticipate discharge home. Will provide analgesics as well. Patient drove, will avoid sedating medications.      ED Course as of 11/03/22 0110   Thu Nov 03, 2022   0000 CT HEAD WO CONTRAST  IMPRESSION:  No acute intracranial abnormality. Partially empty sella, nonspecific and may be seen with idiopathic  intracranial hypertension, unchanged. [JS]      ED Course User Index  [JS] [place], DO    :   Patient updated on CT scan findings. Without optic disc abnormalities lower concern for idiopathic intracranial hyper tension. Discussed close follow-up with neurology. Patient was educated on headache management at home and we discussed following up with neurology for other medications. We also discussed hygiene and management at home. Discharged in stable condition. DIAGNOSTIC RESULTS / EMERGENCYDEPARTMENT COURSE / MDM     LABS:  Labs Reviewed   CBC WITH AUTO DIFFERENTIAL - Abnormal; Notable for the following components:       Result Value    WBC 14.4 (*)     RBC 3.88 (*)     Hemoglobin 10.7 (*)     Hematocrit 33.3 (*)     Segs Absolute 8.56 (*)     Absolute Lymph # 4.65 (*)     All other components within normal limits   BASIC METABOLIC PANEL   HCG, SERUM, QUALITATIVE           CT HEAD WO CONTRAST    Result Date: 11/2/2022  EXAMINATION: CT OF THE HEAD WITHOUT CONTRAST  11/2/2022 10:37 pm TECHNIQUE: CT of the head was performed without the administration of intravenous contrast. Automated exposure control, iterative reconstruction, and/or weight based adjustment of the mA/kV was utilized to reduce the radiation dose to as low as reasonably achievable. COMPARISON: CT head, CTA head and neck 06/17/2021 HISTORY: ORDERING SYSTEM PROVIDED HISTORY: increased pressure, h/o possible pseudotumor cerebri TECHNOLOGIST PROVIDED HISTORY: increased pressure, h/o possible pseudotumor cerebri Decision Support Exception - unselect if not a suspected or confirmed emergency medical condition->Emergency Medical Condition (MA) Is the patient pregnant?->No Reason for Exam: HEADACHE FINDINGS: BRAIN/VENTRICLES: There is no acute intracranial hemorrhage, mass effect or midline shift. No abnormal extra-axial fluid collection.   The gray-white differentiation is maintained without evidence of an acute infarct. There is no evidence of hydrocephalus. Partially empty sella, nonspecific and may be seen with idiopathic intracranial hypertension, unchanged. ORBITS: The visualized portion of the orbits demonstrate no acute abnormality. SINUSES: Ethmoid, sphenoid, maxillary, left frontal mucosal thickening. Mastoid air cells are clear. SOFT TISSUES/SKULL:  No acute abnormality of the visualized skull or soft tissues. No acute intracranial abnormality. Partially empty sella, nonspecific and may be seen with idiopathic intracranial hypertension, unchanged. EKG      All EKG's are interpreted by the Emergency Department Physicianwho either signs or Co-signs this chart in the absence of a cardiologist.      PROCEDURES:  None    CONSULTS:  None    CRITICAL CARE:  Please see attending note    FINAL IMPRESSION      1. Pressure in head    2.  Neck pain          DISPOSITION / PLAN     DISPOSITION Decision To Discharge 11/03/2022 12:01:05 AM      PATIENT REFERRED TO:  DEON Pérez  [address] [facility]  [phone]    Schedule an appointment as soon as possible for a visit in 3 days      [facility]  [address] [address]  [phone]    If symptoms worsen    Your Neurologist    Schedule an appointment as soon as possible for a visit in 1 week      DISCHARGE MEDICATIONS:  Discharge Medication List as of 11/3/2022 12:08 AM        START taking these medications    Details   cyclobenzaprine (FLEXERIL) 10 MG tablet Take 1 tablet by mouth 2 times daily as needed for Muscle spasms, Disp-12 tablet, R-0Print             Jayna Milligan DO  Emergency Medicine Resident    (Please note that portions of this note were completed with a voice recognition program.Efforts were made to edit the dictations but occasionally words are mis-transcribed.)        Jayna Milligan DO  Resident  11/03/22 0110

## 2022-11-03 NOTE — DISCHARGE INSTRUCTIONS
You were seen in the emergency department for head pressure and neck pain. Labs are mostly normal, white count was slightly elevated. Your CT of your head did not show any major change. It is difficult to say what exactly is causing this pressure sensation. I recommend you follow-up with your neurologist as soon as possible, ideally within the next 1 to 2 weeks. If you have any new or worsening symptoms, please return to the ED for reevaluation. I prescribed Flexeril to help with the muscular tension in your neck. Do not drive if you are taking this as it can make you drowsy. You should try melatonin for sleep aid    Call today or tomorrow to follow up with DEON Maravilla  in 1 days. Smoking cigarettes or being around anyone that smokes cigarettes can cause constriction of the blood vessels in the brain which in turn can cause you to have further headaches. Use ibuprofen or Tylenol (unless prescribed medications that have Tylenol in it) for pain. Avoid taking narcotics for headaches (can cause a worse headache in several hours after taking the medication). Make sure you are drinking plenty of water or Gatorade to keep yourself hydrated. Return to the Emergency Department for worsening of headache, change in vision / hearing / taste. Ringing in your ears. Loss of sensation or difficulty moving your arms or legs. Any other care or concern.

## 2022-11-03 NOTE — ED PROVIDER NOTES
[facility]  Emergency Department  Faculty Attestation     I performed a history and physical examination of the patient and discussed management with the resident. I reviewed the residents note and agree with the documented findings and plan of care. Any areas of disagreement are noted on the chart. I was personally present for the key portions of any procedures. I have documented in the chart those procedures where I was not present during the key portions. I have reviewed the emergency nurses triage note. I agree with the chief complaint, past medical history, past surgical history, allergies, medications, social and family history as documented unless otherwise noted below. For Physician Assistant/ Nurse Practitioner cases/documentation I have personally evaluated this patient and have completed at least one if not all key elements of the E/M (history, physical exam, and MDM). Additional findings are as noted. Primary Care Physician:  DEON Carr    Screenings:  [unfilled]    CHIEF COMPLAINT       Chief Complaint   Patient presents with    Headache     Trouble sleeping     Neck Pain    Dizziness       RECENT VITALS:   Temp: 98.4 °F (36.9 °C),  Heart Rate: (!) 102, Resp: 16, BP: (!) 139/95    LABS:  Labs Reviewed   CBC WITH AUTO DIFFERENTIAL - Abnormal; Notable for the following components:       Result Value    WBC 14.4 (*)     RBC 3.88 (*)     Hemoglobin 10.7 (*)     Hematocrit 33.3 (*)     Segs Absolute 8.56 (*)     Absolute Lymph # 4.65 (*)     All other components within normal limits   BASIC METABOLIC PANEL   HCG, SERUM, QUALITATIVE       Radiology  CT HEAD WO CONTRAST   Final Result   No acute intracranial abnormality. Partially empty sella, nonspecific and may be seen with idiopathic   intracranial hypertension, unchanged.              Attending Physician Additional  Notes    Patient history of chronic recurrent headaches, has empty sella and concern for idiopathic intracranial hypertension, no known dural sinus thrombosis. She has never had lumbar puncture to assess pressures or Diamox prescription. She has no vision changes. She waited ophthalmologist yesterday and had normal exam including normal discs and visual acuity. She has achiness in the back of her neck. No stiff neck. No fevers. No stroke symptoms. No viral URI symptoms. On exam she is uncomfortable, minimally hypertensive, slightly tachycardic, afebrile. Neck is supple, negative jolt maneuver. Normal speech and mentation. Normal pupils extract movements. Cranial nerves grossly intact. Motor strength grossly intact. Skin is warm and dry without rash. Impression is headache, muscle aches, consider tension headache. Plan is analgesics or migraine cocktail, CT imaging, laboratory studies, reassess. Anticipate discharge. Jose Rondon.  Kimberly Richmond MD, [address] [facility]  Attending Emergency  Physician                Juanita Call MD  11/03/22 0005

## 2024-10-14 ENCOUNTER — HOSPITAL ENCOUNTER (EMERGENCY)
Age: 29
Discharge: HOME | End: 2024-10-14

## 2024-10-14 VITALS
WEIGHT: 260 LBS | DIASTOLIC BLOOD PRESSURE: 91 MMHG | HEART RATE: 79 BPM | HEIGHT: 61 IN | OXYGEN SATURATION: 100 % | TEMPERATURE: 98.5 F | BODY MASS INDEX: 49.09 KG/M2 | SYSTOLIC BLOOD PRESSURE: 129 MMHG | RESPIRATION RATE: 18 BRPM

## 2024-10-14 DIAGNOSIS — N64.4 BREAST PAIN: Primary | ICD-10-CM

## 2024-10-14 LAB — HCG UR QL: NEGATIVE

## 2024-10-14 PROCEDURE — 81025 URINE PREGNANCY TEST: CPT

## 2024-10-14 PROCEDURE — 99283 EMERGENCY DEPT VISIT LOW MDM: CPT

## 2024-10-14 RX ORDER — HYDROCODONE BITARTRATE AND ACETAMINOPHEN 5; 325 MG/1; MG/1
1-2 TABLET ORAL EVERY 8 HOURS PRN
Qty: 12 TABLET | Refills: 0 | Status: SHIPPED | OUTPATIENT
Start: 2024-10-14 | End: 2024-10-17

## 2024-10-14 ASSESSMENT — PAIN DESCRIPTION - FREQUENCY: FREQUENCY: CONTINUOUS

## 2024-10-14 ASSESSMENT — PAIN DESCRIPTION - ORIENTATION: ORIENTATION: LEFT

## 2024-10-14 ASSESSMENT — PAIN - FUNCTIONAL ASSESSMENT: PAIN_FUNCTIONAL_ASSESSMENT: 0-10

## 2024-10-14 ASSESSMENT — PAIN SCALES - GENERAL: PAINLEVEL_OUTOF10: 10

## 2024-10-14 ASSESSMENT — PAIN DESCRIPTION - DESCRIPTORS: DESCRIPTORS: BURNING

## 2024-10-14 ASSESSMENT — PAIN DESCRIPTION - LOCATION: LOCATION: BREAST

## 2024-10-14 NOTE — ED NOTES
Patient with c/o left sided breast pain ongoing for a few days. Describes pain as burning in nature. Does have existing nipple ring that she states she has noticed some drainage from. Patient states that she does have a mammogram scheduled this Thursday.

## 2024-10-14 NOTE — ED PROVIDER NOTES
[facility] ED  eMERGENCY dEPARTMENTeNCOUnter      Pt Name: Randy Shea  MRN: [id number]  Birthdate 1995  Date ofevaluation: 10/14/2024  Provider: Terry Santos PA-C    CHIEF COMPLAINT       Chief Complaint   Patient presents with    Breast Pain     Left, states has a nipple ring, burning to breast         HISTORY OF PRESENT ILLNESS  (Location/Symptom, Timing/Onset, Context/Setting, Quality, Duration, Modifying Factors, Severity.)   Randy Shea is a 29 y.o. female who presents to the emergency department with pain to the left breast over the last week.  Mammogram scheduled for Thursday.  She has bilateral nipple rings.  Have been in place for over 3 years.  Feels that she has been noticing drainage coming out of the left nipple      Nursing Notes were reviewed.    ALLERGIES     Shrimp (diagnostic)    CURRENT MEDICATIONS       Discharge Medication List as of 10/14/2024  7:45 PM        CONTINUE these medications which have NOT CHANGED    Details   cyclobenzaprine (FLEXERIL) 10 MG tablet Take 1 tablet by mouth 2 times daily as needed for Muscle spasms, Disp-12 tablet, R-0Print      ondansetron (ZOFRAN ODT) 4 MG disintegrating tablet Take 1 tablet by mouth every 8 hours as needed for Nausea, Disp-12 tablet, R-0Print      pantoprazole (PROTONIX) 20 MG tablet Take 2 tablets by mouth daily, Disp-30 tablet, R-5Print      aluminum-magnesium hydroxide 200-200 MG/5ML suspension Take 10 mLs by mouth every 6 hours as needed for Indigestion, Disp-710 mL, R-mlPrint      amitriptyline (ELAVIL) 10 MG tablet Take 10 mg by mouth nightlyHistorical Med      albuterol sulfate HFA (VENTOLIN HFA) 108 (90 Base) MCG/ACT inhaler Inhale 2 puffs into the lungs 4 times daily as needed for Wheezing, Disp-1 Inhaler, R-2Normal             PAST MEDICAL HISTORY         Diagnosis Date    Asthma     Headache(784.0)     Pseudotumor        SURGICAL HISTORY           Procedure Laterality Date    TONSILLECTOMY           HISTORY

## 2024-10-14 NOTE — DISCHARGE INSTRUCTIONS
Take meds as prescribed.  Follow up with doctor  in 3 -4 days.  Return to ER immediately if symptoms worsen or persist.  Do not drive, operate machinery, climb or engage in any dangerous activity while taking narcotics or norco